# Patient Record
Sex: FEMALE | Race: WHITE | NOT HISPANIC OR LATINO | Employment: FULL TIME | URBAN - METROPOLITAN AREA
[De-identification: names, ages, dates, MRNs, and addresses within clinical notes are randomized per-mention and may not be internally consistent; named-entity substitution may affect disease eponyms.]

---

## 2017-02-13 ENCOUNTER — HOSPITAL ENCOUNTER (EMERGENCY)
Facility: HOSPITAL | Age: 47
Discharge: HOME | End: 2017-02-13
Attending: EMERGENCY MEDICINE | Admitting: EMERGENCY MEDICINE

## 2017-02-13 ENCOUNTER — APPOINTMENT (OUTPATIENT)
Dept: GENERAL RADIOLOGY | Facility: HOSPITAL | Age: 47
End: 2017-02-13

## 2017-02-13 VITALS
RESPIRATION RATE: 16 BRPM | WEIGHT: 148 LBS | DIASTOLIC BLOOD PRESSURE: 95 MMHG | SYSTOLIC BLOOD PRESSURE: 140 MMHG | TEMPERATURE: 98.5 F | HEIGHT: 63 IN | BODY MASS INDEX: 26.22 KG/M2 | OXYGEN SATURATION: 98 % | HEART RATE: 75 BPM

## 2017-02-13 DIAGNOSIS — R55 NEAR SYNCOPE: Primary | ICD-10-CM

## 2017-02-13 LAB
ALBUMIN SERPL-MCNC: 4.3 G/DL (ref 3.5–5.2)
ALBUMIN/GLOB SERPL: 1.6 G/DL
ALP SERPL-CCNC: 77 U/L (ref 40–129)
ALT SERPL W P-5'-P-CCNC: 22 U/L (ref 5–33)
ANION GAP SERPL CALCULATED.3IONS-SCNC: 12 MMOL/L
AST SERPL-CCNC: 22 U/L (ref 5–32)
B-HCG UR QL: NEGATIVE
BASOPHILS # BLD AUTO: 0.02 10*3/MM3 (ref 0–0.2)
BASOPHILS NFR BLD AUTO: 0.3 % (ref 0–2)
BILIRUB SERPL-MCNC: 0.4 MG/DL (ref 0.2–1.2)
BILIRUB UR QL STRIP: NEGATIVE
BUN BLD-MCNC: 5 MG/DL (ref 6–20)
BUN/CREAT SERPL: 7.7 (ref 7–25)
CALCIUM SPEC-SCNC: 8.6 MG/DL (ref 8.6–10.5)
CHLORIDE SERPL-SCNC: 104 MMOL/L (ref 98–107)
CLARITY UR: CLEAR
CO2 SERPL-SCNC: 23 MMOL/L (ref 22–29)
COLOR UR: YELLOW
CREAT BLD-MCNC: 0.65 MG/DL (ref 0.57–1)
DEPRECATED RDW RBC AUTO: 43.7 FL (ref 37–54)
EOSINOPHIL # BLD AUTO: 0.08 10*3/MM3 (ref 0.1–0.3)
EOSINOPHIL NFR BLD AUTO: 1.2 % (ref 0–4)
ERYTHROCYTE [DISTWIDTH] IN BLOOD BY AUTOMATED COUNT: 13.4 % (ref 11.5–14.5)
GFR SERPL CREATININE-BSD FRML MDRD: 98 ML/MIN/1.73
GLOBULIN UR ELPH-MCNC: 2.7 GM/DL
GLUCOSE BLD-MCNC: 109 MG/DL (ref 65–99)
GLUCOSE UR STRIP-MCNC: NEGATIVE MG/DL
HCT VFR BLD AUTO: 42.4 % (ref 37–47)
HGB BLD-MCNC: 13.9 G/DL (ref 12–16)
HGB UR QL STRIP.AUTO: NEGATIVE
IMM GRANULOCYTES # BLD: 0.02 10*3/MM3 (ref 0–0.03)
IMM GRANULOCYTES NFR BLD: 0.3 % (ref 0–0.5)
KETONES UR QL STRIP: NEGATIVE
LEUKOCYTE ESTERASE UR QL STRIP.AUTO: NEGATIVE
LIPASE SERPL-CCNC: 22 U/L (ref 13–60)
LYMPHOCYTES # BLD AUTO: 1.41 10*3/MM3 (ref 0.6–4.8)
LYMPHOCYTES NFR BLD AUTO: 21.9 % (ref 20–45)
MCH RBC QN AUTO: 28.8 PG (ref 27–31)
MCHC RBC AUTO-ENTMCNC: 32.8 G/DL (ref 31–37)
MCV RBC AUTO: 88 FL (ref 81–99)
MONOCYTES # BLD AUTO: 0.41 10*3/MM3 (ref 0–1)
MONOCYTES NFR BLD AUTO: 6.4 % (ref 3–8)
NEUTROPHILS # BLD AUTO: 4.51 10*3/MM3 (ref 1.5–8.3)
NEUTROPHILS NFR BLD AUTO: 69.9 % (ref 45–70)
NITRITE UR QL STRIP: NEGATIVE
NRBC BLD MANUAL-RTO: 0 /100 WBC (ref 0–0)
PH UR STRIP.AUTO: 5.5 [PH] (ref 4.5–8)
PLATELET # BLD AUTO: 318 10*3/MM3 (ref 140–500)
PMV BLD AUTO: 10 FL (ref 7.4–10.4)
POTASSIUM BLD-SCNC: 3.9 MMOL/L (ref 3.5–5.2)
PROT SERPL-MCNC: 7 G/DL (ref 6–8.5)
PROT UR QL STRIP: NEGATIVE
RBC # BLD AUTO: 4.82 10*6/MM3 (ref 4.2–5.4)
SODIUM BLD-SCNC: 139 MMOL/L (ref 136–145)
SP GR UR STRIP: <=1.005 (ref 1–1.03)
TROPONIN T SERPL-MCNC: <0.01 NG/ML (ref 0–0.03)
UROBILINOGEN UR QL STRIP: NORMAL
WBC NRBC COR # BLD: 6.45 10*3/MM3 (ref 4.8–10.8)

## 2017-02-13 PROCEDURE — 81025 URINE PREGNANCY TEST: CPT | Performed by: EMERGENCY MEDICINE

## 2017-02-13 PROCEDURE — 99284 EMERGENCY DEPT VISIT MOD MDM: CPT

## 2017-02-13 PROCEDURE — 99284 EMERGENCY DEPT VISIT MOD MDM: CPT | Performed by: EMERGENCY MEDICINE

## 2017-02-13 PROCEDURE — 84484 ASSAY OF TROPONIN QUANT: CPT | Performed by: EMERGENCY MEDICINE

## 2017-02-13 PROCEDURE — 85025 COMPLETE CBC W/AUTO DIFF WBC: CPT | Performed by: EMERGENCY MEDICINE

## 2017-02-13 PROCEDURE — 83690 ASSAY OF LIPASE: CPT | Performed by: EMERGENCY MEDICINE

## 2017-02-13 PROCEDURE — 81003 URINALYSIS AUTO W/O SCOPE: CPT | Performed by: EMERGENCY MEDICINE

## 2017-02-13 PROCEDURE — 93005 ELECTROCARDIOGRAM TRACING: CPT | Performed by: EMERGENCY MEDICINE

## 2017-02-13 PROCEDURE — 71010 HC CHEST PA OR AP: CPT

## 2017-02-13 PROCEDURE — 80053 COMPREHEN METABOLIC PANEL: CPT | Performed by: EMERGENCY MEDICINE

## 2017-02-13 PROCEDURE — 93010 ELECTROCARDIOGRAM REPORT: CPT | Performed by: INTERNAL MEDICINE

## 2017-02-13 RX ORDER — ALBUTEROL SULFATE 90 UG/1
AEROSOL, METERED RESPIRATORY (INHALATION)
COMMUNITY
Start: 2015-04-28

## 2017-02-13 RX ORDER — ALPRAZOLAM 0.5 MG/1
0.5 TABLET ORAL 3 TIMES DAILY PRN
COMMUNITY
Start: 2014-08-29 | End: 2018-08-06 | Stop reason: HOSPADM

## 2017-02-13 RX ORDER — PANTOPRAZOLE SODIUM 40 MG/1
TABLET, DELAYED RELEASE ORAL DAILY
COMMUNITY
Start: 2015-08-18 | End: 2017-06-23

## 2017-02-13 RX ORDER — RABEPRAZOLE SODIUM 20 MG/1
20 TABLET, DELAYED RELEASE ORAL DAILY
COMMUNITY
End: 2017-06-23

## 2017-02-13 NOTE — ED PROVIDER NOTES
Subjective   Patient is a 46 y.o. female presenting with syncope.   Syncope   Episode history:  Single  Most recent episode:  Today  Timing:  Constant  Progression:  Resolved  Chronicity:  New  Context comment:  At work walking felt lightheaded numbness all body then passes out no injury  Witnessed: yes    Relieved by:  Nothing  Worsened by:  Nothing  Ineffective treatments:  None tried  Associated symptoms: no chest pain, no confusion, no difficulty breathing, no fever, no focal weakness, no headaches and no shortness of breath        Review of Systems   Constitutional: Negative for fever.   Respiratory: Negative for shortness of breath.    Cardiovascular: Positive for syncope. Negative for chest pain.   Neurological: Negative for focal weakness and headaches.   Psychiatric/Behavioral: Negative for confusion.   All other systems reviewed and are negative.      Past Medical History   Diagnosis Date   • Anxiety    • GERD (gastroesophageal reflux disease)        Allergies   Allergen Reactions   • Cefprozil    • Codeine    • Sulfa Antibiotics    • Tramadol        Past Surgical History   Procedure Laterality Date   • Tubal abdominal ligation     • Hysterectomy     • Cholecystectomy     • Appendectomy     • Shoulder arthroscopy Right        No family history on file.    Social History     Social History   • Marital status:      Spouse name: N/A   • Number of children: N/A   • Years of education: N/A     Social History Main Topics   • Smoking status: Current Every Day Smoker     Packs/day: 1.00     Types: Cigarettes   • Smokeless tobacco: Not on file   • Alcohol use Yes      Comment: seldom   • Drug use: No   • Sexual activity: Defer     Other Topics Concern   • Not on file     Social History Narrative   • No narrative on file           Objective   Physical Exam   Constitutional: She is oriented to person, place, and time. She appears well-developed and well-nourished. No distress.   HENT:   Head: Normocephalic and  atraumatic.   Mouth/Throat: Oropharynx is clear and moist.   Eyes: Conjunctivae and EOM are normal. Pupils are equal, round, and reactive to light.   Neck: Normal range of motion. Neck supple.   Cardiovascular: Normal rate, regular rhythm, normal heart sounds and intact distal pulses.    Pulmonary/Chest: Effort normal and breath sounds normal.   Abdominal: Soft. Bowel sounds are normal. She exhibits no distension. There is no tenderness.   Musculoskeletal: Normal range of motion. She exhibits no edema.   Neurological: She is alert and oriented to person, place, and time. She has normal reflexes. No cranial nerve deficit.   Skin: Skin is warm. No erythema. No pallor.   Psychiatric: She has a normal mood and affect.   Nursing note and vitals reviewed.      Procedures         ED Course  ED Course                  MDM  Number of Diagnoses or Management Options  Diagnosis management comments: Reeval, appears well, vss, ok with plan to f/u pmd/cards        Final diagnoses:   None            Flo Pisano MD  02/13/17 [id number]

## 2017-02-14 ENCOUNTER — HOSPITAL ENCOUNTER (EMERGENCY)
Facility: HOSPITAL | Age: 47
End: 2017-02-14

## 2017-06-23 ENCOUNTER — OFFICE VISIT (OUTPATIENT)
Dept: OBSTETRICS AND GYNECOLOGY | Facility: CLINIC | Age: 47
End: 2017-06-23

## 2017-06-23 VITALS
DIASTOLIC BLOOD PRESSURE: 98 MMHG | HEIGHT: 63 IN | BODY MASS INDEX: 27.46 KG/M2 | SYSTOLIC BLOOD PRESSURE: 142 MMHG | WEIGHT: 155 LBS

## 2017-06-23 DIAGNOSIS — R10.32 LLQ PAIN: ICD-10-CM

## 2017-06-23 DIAGNOSIS — Z00.00 ENCOUNTER FOR ANNUAL GENERAL MEDICAL EXAMINATION WITHOUT ABNORMAL FINDINGS IN ADULT: ICD-10-CM

## 2017-06-23 DIAGNOSIS — Z13.9 SCREENING: Primary | ICD-10-CM

## 2017-06-23 DIAGNOSIS — F17.200 SMOKER: ICD-10-CM

## 2017-06-23 PROBLEM — K21.9 GERD (GASTROESOPHAGEAL REFLUX DISEASE): Status: ACTIVE | Noted: 2017-06-23

## 2017-06-23 PROBLEM — K58.9 IBS (IRRITABLE BOWEL SYNDROME): Status: ACTIVE | Noted: 2017-06-23

## 2017-06-23 PROBLEM — F41.9 ANXIETY: Status: ACTIVE | Noted: 2017-06-23

## 2017-06-23 PROBLEM — Z90.710 S/P LAPAROSCOPIC HYSTERECTOMY: Status: ACTIVE | Noted: 2017-06-23

## 2017-06-23 LAB
BILIRUB BLD-MCNC: NEGATIVE MG/DL
CLARITY, POC: CLEAR
COLOR UR: YELLOW
GLUCOSE UR STRIP-MCNC: NEGATIVE MG/DL
KETONES UR QL: NEGATIVE
LEUKOCYTE EST, POC: NEGATIVE
NITRITE UR-MCNC: NEGATIVE MG/ML
PH UR: 5 [PH] (ref 5–8)
PROT UR STRIP-MCNC: NEGATIVE MG/DL
RBC # UR STRIP: NEGATIVE /UL
SP GR UR: 1.02 (ref 1–1.03)
UROBILINOGEN UR QL: NORMAL

## 2017-06-23 PROCEDURE — 81002 URINALYSIS NONAUTO W/O SCOPE: CPT | Performed by: OBSTETRICS & GYNECOLOGY

## 2017-06-23 PROCEDURE — 99396 PREV VISIT EST AGE 40-64: CPT | Performed by: OBSTETRICS & GYNECOLOGY

## 2017-06-23 RX ORDER — LISINOPRIL 10 MG/1
10 TABLET ORAL DAILY
COMMUNITY
End: 2023-03-24

## 2017-06-23 RX ORDER — LINACLOTIDE 145 UG/1
CAPSULE, GELATIN COATED ORAL
COMMUNITY
Start: 2017-06-11 | End: 2021-11-24

## 2017-06-23 RX ORDER — DEXLANSOPRAZOLE 60 MG/1
CAPSULE, DELAYED RELEASE ORAL
COMMUNITY
Start: 2017-06-11 | End: 2021-11-24

## 2017-06-23 RX ORDER — FLUTICASONE PROPIONATE 50 MCG
2 SPRAY, SUSPENSION (ML) NASAL DAILY
COMMUNITY

## 2017-06-23 NOTE — PROGRESS NOTES
GYN Annual Exam     CC- Here for annual exam.     Jeannie Peacock is a 46 y.o. female est pt who presents for annual well woman exam. Has had TLH, no cycles. Occ has some LLQ pain.     OB History      Para Term  AB TAB SAB Ectopic Multiple Living    1 1 1       1        Obstetric Comments    1           Menarche: 11  Current contraception: status post hysterectomy  History of abnormal Pap smear: yes - s/p cryo  History of abnormal mammogram: no  Family history of uterine, colon or ovarian cancer: no  Family history of breast cancer: no  H/o STDs: none    Health Maintenance   Topic Date Due   • PNEUMOCOCCAL VACCINE (19-64 MEDIUM RISK) (1 of 1 - PPSV23) 10/20/1988   • TDAP/TD VACCINES (1 - Tdap) 10/20/1988   • PAP SMEAR  2017   • INFLUENZA VACCINE  2017       Past Medical History:   Diagnosis Date   • Abnormal Pap smear of cervix        • Anxiety    • Cervical dysplasia     s/p cryo   • GERD (gastroesophageal reflux disease)    • IBS (irritable bowel syndrome) 2017   • S/P laparoscopic hysterectomy 2017   • Smoker 2017       Past Surgical History:   Procedure Laterality Date   • APPENDECTOMY     • CHOLECYSTECTOMY     • DILATATION AND CURETTAGE     • ENDOMETRIAL ABLATION     • EXPLORATORY LAPAROTOMY     • GYNECOLOGIC CRYOSURGERY     • HYSTERECTOMY     • SHOULDER ARTHROSCOPY Right    • TUBAL ABDOMINAL LIGATION           Current Outpatient Prescriptions:   •  ALPRAZolam (XANAX) 0.5 MG tablet, Take 0.5 mg by mouth 3 (Three) Times a Day As Needed for anxiety., Disp: , Rfl:   •  DEXILANT 60 MG capsule, , Disp: , Rfl:   •  fluticasone (FLONASE) 50 MCG/ACT nasal spray, 2 sprays into each nostril Daily., Disp: , Rfl:   •  LINZESS 145 MCG capsule, , Disp: , Rfl:   •  lisinopril (PRINIVIL,ZESTRIL) 10 MG tablet, Take 10 mg by mouth Daily., Disp: , Rfl:   •  Probiotic Product (PROBIOTIC DAILY PO), Take 1 tablet by mouth Daily., Disp: , Rfl:   •  albuterol (PROAIR HFA) 108 (90 BASE)  "MCG/ACT inhaler, ProAir  (90 Base) MCG/ACT Inhalation Aerosol Solution; Patient Sig: ProAir  (90 Base) MCG/ACT Inhalation Aerosol Solution ; 0; 28-Apr-2015; Active, Disp: , Rfl:     Allergies   Allergen Reactions   • Cefprozil    • Codeine    • Sulfa Antibiotics    • Tramadol        Social History   Substance Use Topics   • Smoking status: Current Every Day Smoker     Packs/day: 1.00     Types: Cigarettes   • Smokeless tobacco: None   • Alcohol use Yes      Comment: seldom       Family History   Problem Relation Age of Onset   • Breast cancer Neg Hx    • Ovarian cancer Neg Hx    • Colon cancer Neg Hx        Review of Systems   Constitutional: Negative for appetite change, fatigue, fever and unexpected weight change.   Respiratory: Negative for cough and shortness of breath.    Cardiovascular: Negative for chest pain and palpitations.   Gastrointestinal: Positive for abdominal pain (LLQ pain). Negative for abdominal distention, constipation, diarrhea and nausea.   Genitourinary: Negative for dyspareunia, dysuria, menstrual problem, pelvic pain and vaginal discharge.   Skin: Negative for color change and rash.   Neurological: Negative for headaches.   Psychiatric/Behavioral: Negative for dysphoric mood. The patient is not nervous/anxious.        /98  Ht 63\" (160 cm)  Wt 155 lb (70.3 kg)  BMI 27.46 kg/m2    Physical Exam   Constitutional: She is oriented to person, place, and time. She appears well-developed and well-nourished.   HENT:   Head: Normocephalic and atraumatic.   Neck: No thyromegaly present.   Cardiovascular: Normal rate and regular rhythm.    Pulmonary/Chest: Effort normal and breath sounds normal. Right breast exhibits no inverted nipple, no mass, no nipple discharge, no skin change and no tenderness. Left breast exhibits no inverted nipple, no mass, no nipple discharge, no skin change and no tenderness.   Abdominal: Soft. Bowel sounds are normal. She exhibits no distension and no " mass. There is no tenderness. No hernia.   Genitourinary: Pelvic exam was performed with patient supine. There is no rash, tenderness or lesion on the right labia. There is no rash, tenderness or lesion on the left labia. Right adnexum displays no mass, no tenderness and no fullness. Left adnexum displays tenderness. Left adnexum displays no mass and no fullness. No erythema, tenderness or bleeding in the vagina. No signs of injury around the vagina. No vaginal discharge found.   Genitourinary Comments: TTP LLQ  No rebound nor guarding  Normal cuff   Neurological: She is oriented to person, place, and time.   Skin: Skin is warm and dry.   Psychiatric: She has a normal mood and affect. Her behavior is normal. Judgment and thought content normal.   Vitals reviewed.         Assessment/Plan    1) GYN HM: check pap/HPV   SBE demonstrated and encouraged.  2) STD screening: declines Condoms encouraged.  3) Contraception: s/p TLH  4) Family Planning: no plans  5) Diet and Exercise discussed  6) Smoking Status: counseled 3-10 min re: cessation  7) Social: , works at [employer]  8) MMG- scheduled for end of month  9)Follow up prn or 1 year  10) LLQ pain exam- check TVUS     Jeannie was seen today for gynecologic exam.    Diagnoses and all orders for this visit:    Screening  -     POC Urinalysis Dipstick    Encounter for annual general medical examination without abnormal findings in adult  -     Pap IG, HPV-hr    Smoker    LLQ pain          Soraya Mccabe MD  6/23/2017  10:02 PM

## 2017-06-27 LAB
CYTOLOGIST CVX/VAG CYTO: NORMAL
CYTOLOGY CVX/VAG DOC THIN PREP: NORMAL
DX ICD CODE: NORMAL
DX ICD CODE: NORMAL
HIV 1 & 2 AB SER-IMP: NORMAL
HPV I/H RISK 1 DNA CVX QL PROBE+SIG AMP: NEGATIVE
OTHER STN SPEC: NORMAL
PATH REPORT.FINAL DX SPEC: NORMAL
STAT OF ADQ CVX/VAG CYTO-IMP: NORMAL

## 2017-06-27 RX ORDER — METRONIDAZOLE 500 MG/1
500 TABLET ORAL 2 TIMES DAILY
Qty: 14 TABLET | Refills: 0 | Status: SHIPPED | OUTPATIENT
Start: 2017-06-27 | End: 2017-07-04

## 2017-06-30 ENCOUNTER — PROCEDURE VISIT (OUTPATIENT)
Dept: OBSTETRICS AND GYNECOLOGY | Facility: CLINIC | Age: 47
End: 2017-06-30

## 2017-06-30 DIAGNOSIS — R10.2 PELVIC PAIN: Primary | ICD-10-CM

## 2017-09-18 ENCOUNTER — PROCEDURE VISIT (OUTPATIENT)
Dept: OBSTETRICS AND GYNECOLOGY | Facility: CLINIC | Age: 47
End: 2017-09-18

## 2017-09-18 ENCOUNTER — OFFICE VISIT (OUTPATIENT)
Dept: OBSTETRICS AND GYNECOLOGY | Facility: CLINIC | Age: 47
End: 2017-09-18

## 2017-09-18 VITALS
BODY MASS INDEX: 27.52 KG/M2 | HEIGHT: 63 IN | SYSTOLIC BLOOD PRESSURE: 142 MMHG | WEIGHT: 155.3 LBS | DIASTOLIC BLOOD PRESSURE: 80 MMHG

## 2017-09-18 DIAGNOSIS — N83.201 CYSTS OF BOTH OVARIES: Primary | ICD-10-CM

## 2017-09-18 DIAGNOSIS — N83.202 CYSTS OF BOTH OVARIES: Primary | ICD-10-CM

## 2017-09-18 DIAGNOSIS — R30.0 DYSURIA: ICD-10-CM

## 2017-09-18 DIAGNOSIS — R10.2 PELVIC PAIN: Primary | ICD-10-CM

## 2017-09-18 PROCEDURE — 99213 OFFICE O/P EST LOW 20 MIN: CPT | Performed by: OBSTETRICS & GYNECOLOGY

## 2017-09-18 PROCEDURE — 76830 TRANSVAGINAL US NON-OB: CPT | Performed by: OBSTETRICS & GYNECOLOGY

## 2017-09-18 NOTE — PROGRESS NOTES
"Jeannie Peacock is a 46 y.o. patient who presents for follow up of   Chief Complaint   Patient presents with   • Follow-up     ovarian cyst, pain and pressure and leg pain on that side        45 yo est pt here for a several month h/o pelvic pressure in the midline. She is having urinary frequency and pressure and feels like her bladder is not emptying well. She had a C scope in April of 2017 and it was normal. She has a h/o IBS but this pain feels lower down in her pelvis. She does not have any pain or bleeding, no dysuria. No fevers, no nausea or vomiting. She had an US in June of this year that showed a small R ovarian cyst that was 2.3 x 2.4 cms. She has been taking Linzess for constipation but stopped it last week after an episode of fecal incontinence.       The following portions of the patient's history were reviewed and updated as appropriate: allergies, current medications and problem list.    Review of Systems   Gastrointestinal: Negative for nausea and vomiting.   Genitourinary: Positive for frequency and pelvic pain.   Musculoskeletal: Positive for back pain.   All other systems reviewed and are negative.      /80  Ht 63\" (160 cm)  Wt 155 lb 4.8 oz (70.4 kg)  BMI 27.51 kg/m2    Physical Exam   Constitutional: She is oriented to person, place, and time. She appears well-developed and well-nourished.   HENT:   Head: Normocephalic and atraumatic.   Abdominal: Soft. Bowel sounds are normal. She exhibits no distension and no mass. There is no tenderness. There is no rebound and no guarding. No hernia.   Genitourinary: There is no rash, tenderness, lesion or injury on the right labia. There is no rash, tenderness, lesion or injury on the left labia. Right adnexum displays no mass, no tenderness and no fullness. Left adnexum displays tenderness. Left adnexum displays no mass and no fullness. No erythema, tenderness or bleeding in the vagina. No foreign body in the vagina. No signs of injury " around the vagina. No vaginal discharge found.   Genitourinary Comments: Normal cuff  TTP LLQ   Neurological: She is alert and oriented to person, place, and time.   Skin: Skin is warm and dry.   Psychiatric: She has a normal mood and affect. Her behavior is normal. Judgment and thought content normal.   Nursing note and vitals reviewed.    A/P:  1. LLQ pain- check TVUS. If US remains normal, consider CT scan.Pt had normal C scope in 6/2017.  2. Frequency/dysuria- check urine culture. Enc pt to decrease bladder irritants  3. RHM- pap UTD 6/2017    Assessment/Plan   Jeannie was seen today for follow-up.    Diagnoses and all orders for this visit:    Pelvic pain  -     Urine Culture    Dysuria                   No Follow-up on file.      Soraya Mccabe MD    9/24/2017  5:03 PM

## 2017-09-20 LAB
BACTERIA UR CULT: NO GROWTH
BACTERIA UR CULT: NORMAL

## 2017-09-20 NOTE — PROGRESS NOTES
PIP= US shows very small cysts on both ovaries, less than 2 cms each. This was compared to her US on 6/30/17. I do not think that these are the cause of her discomfort. Is she interested in getting a CT scan to evaluate her pain?

## 2017-09-24 PROBLEM — R30.0 DYSURIA: Status: ACTIVE | Noted: 2017-09-24

## 2017-09-24 PROBLEM — R10.2 PELVIC PAIN: Status: ACTIVE | Noted: 2017-09-24

## 2017-09-26 ENCOUNTER — TELEPHONE (OUTPATIENT)
Dept: OBSTETRICS AND GYNECOLOGY | Facility: CLINIC | Age: 47
End: 2017-09-26

## 2017-09-26 DIAGNOSIS — R10.30 LOWER ABDOMINAL PAIN: Primary | ICD-10-CM

## 2017-09-26 NOTE — TELEPHONE ENCOUNTER
PT CALLED REQ TO HAVE CT OF ABDOMEN DONE WITH AND W/O CONTRAST.   PLS PUT IN ORDER SO WE CAN GET HER SCHEDULED.     THANK YOU :)

## 2017-10-03 ENCOUNTER — HOSPITAL ENCOUNTER (OUTPATIENT)
Dept: CT IMAGING | Facility: HOSPITAL | Age: 47
Discharge: HOME | End: 2017-10-03
Attending: OBSTETRICS & GYNECOLOGY | Admitting: OBSTETRICS & GYNECOLOGY

## 2017-10-03 DIAGNOSIS — R10.30 LOWER ABDOMINAL PAIN: ICD-10-CM

## 2017-10-03 PROCEDURE — 0 DIATRIZOATE MEGLUMINE & SODIUM PER 1 ML: Performed by: OBSTETRICS & GYNECOLOGY

## 2017-10-03 PROCEDURE — 74177 CT ABD & PELVIS W/CONTRAST: CPT

## 2017-10-03 PROCEDURE — 0 IOPAMIDOL PER 1 ML: Performed by: OBSTETRICS & GYNECOLOGY

## 2017-10-03 RX ADMIN — DIATRIZOATE MEGLUMINE AND DIATRIZOATE SODIUM 30 ML: 600; 100 SOLUTION ORAL; RECTAL at 07:45

## 2017-10-03 RX ADMIN — IOPAMIDOL 100 ML: 755 INJECTION, SOLUTION INTRAVENOUS at 09:00

## 2018-01-29 ENCOUNTER — TELEPHONE (OUTPATIENT)
Dept: NEUROSURGERY | Facility: CLINIC | Age: 48
End: 2018-01-29

## 2018-01-29 NOTE — TELEPHONE ENCOUNTER
Left message reminding patient that we need her new patient packet by Thursday Feb 1, 2018 for her new patient appointment. Pt was advised that she will need to bring the packet in to the office or fax it because if she mails it at this point we may not receive it in time to avoid her new patient appointment being cancelled. Left the office contact information including direct fax number.

## 2018-02-02 ENCOUNTER — TELEPHONE (OUTPATIENT)
Dept: NEUROSURGERY | Facility: CLINIC | Age: 48
End: 2018-02-02

## 2018-08-06 ENCOUNTER — OFFICE VISIT (OUTPATIENT)
Dept: NEUROSURGERY | Facility: CLINIC | Age: 48
End: 2018-08-06

## 2018-08-06 VITALS
HEIGHT: 63 IN | WEIGHT: 159 LBS | DIASTOLIC BLOOD PRESSURE: 63 MMHG | BODY MASS INDEX: 28.17 KG/M2 | SYSTOLIC BLOOD PRESSURE: 114 MMHG | HEART RATE: 68 BPM

## 2018-08-06 DIAGNOSIS — M50.30 DEGENERATIVE DISC DISEASE, CERVICAL: ICD-10-CM

## 2018-08-06 DIAGNOSIS — M43.22 CERVICAL SPINE ANKYLOSIS: ICD-10-CM

## 2018-08-06 DIAGNOSIS — M51.36 DEGENERATIVE DISC DISEASE, LUMBAR: Primary | ICD-10-CM

## 2018-08-06 PROCEDURE — 99243 OFF/OP CNSLTJ NEW/EST LOW 30: CPT | Performed by: NURSE PRACTITIONER

## 2018-08-06 RX ORDER — METHYLPREDNISOLONE 4 MG/1
TABLET ORAL
Qty: 1 EACH | Refills: 0 | Status: SHIPPED | OUTPATIENT
Start: 2018-08-06 | End: 2021-11-24

## 2018-08-06 NOTE — PROGRESS NOTES
Subjective   Patient ID: Jeannie Peacock is a 47 y.o. female is being seen for consultation today at the request of BERYL Suazo for neck pain and back pain that radiates into bilateral extremities. Patient has been to PT for treatment and says it did help. Patient has a history of cervical TABITHA's as well.    This is a 47-year-old female with a history of prolonged neck and low back pain.  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  She is recently been transferred into a less physical job but is still required to do some lifting on occasion.  She saw a another neurosurgeon approximate 2 years ago.  No surgery was recommended and she was referred for physical therapy and injection therapies.  The patient denies any radiating arm pain or weakness.  She does have some occasional radiating pain in the buttock and posterior thigh as well as episodes of charley horses in both legs.  She denies any falls or trauma.  She denies any bowel or bladder incontinence. Ms. Peacock was referred to our office for a neurosurgical opinion by Arlene Alexander PA-C.  Notes from today's visit will be forwarded upon completion.      Neck Pain    This is a chronic problem. The current episode started more than 1 year ago (10-15 years). The problem occurs intermittently. The problem has been gradually worsening. The pain is associated with nothing. The pain is present in the anterior neck. The quality of the pain is described as burning, aching, shooting and stabbing (burning in bilateral feet). The pain is at a severity of 5/10. The symptoms are aggravated by position. Worse during: worse at night. Associated symptoms include headaches, leg pain, numbness, tingling and weakness. Pertinent negatives include no pain with swallowing or trouble swallowing. Treatments tried: Physical therapy, epidural injections. The treatment provided mild relief.   Back Pain   This is a chronic problem. Episode onset: 10-15  years. The problem occurs constantly. The problem has been gradually worsening since onset. The pain is present in the lumbar spine (bilateral buttock). The quality of the pain is described as aching. The pain radiates to the right knee, left knee, left thigh, right thigh, right foot and left foot. The pain is at a severity of 9/10. The pain is severe. The symptoms are aggravated by position. Associated symptoms include abdominal pain, headaches, leg pain, numbness, tingling and weakness. Pertinent negatives include no bladder incontinence or bowel incontinence. (No episodes of bowel or bladder incontinence.  Has some occasional episodes of urgency with bowel movements but no loss of bladder or bowel control.) She has tried muscle relaxant and walking (Physical therapy; epidural injections) for the symptoms.       The following portions of the patient's history were reviewed and updated as appropriate: allergies, current medications, past family history, past medical history, past social history, past surgical history and problem list.    Review of Systems   Constitutional: Positive for activity change, appetite change and fatigue.   HENT: Positive for sneezing and tinnitus. Negative for trouble swallowing.    Respiratory: Positive for shortness of breath.    Cardiovascular: Positive for palpitations and leg swelling.   Gastrointestinal: Positive for abdominal pain, constipation and diarrhea. Negative for bowel incontinence.   Genitourinary: Positive for urgency. Negative for bladder incontinence.   Musculoskeletal: Positive for arthralgias, back pain, joint swelling, neck pain and neck stiffness.   Allergic/Immunologic: Positive for environmental allergies.   Neurological: Positive for tingling, syncope, weakness, light-headedness, numbness and headaches.   Psychiatric/Behavioral: Positive for decreased concentration and sleep disturbance. The patient is nervous/anxious.        Objective   Physical Exam    Constitutional: She is oriented to person, place, and time. She appears well-developed and well-nourished. She is cooperative. No distress.   HENT:   Head: Normocephalic and atraumatic.   Eyes: Pupils are equal, round, and reactive to light. Conjunctivae and EOM are normal.   Neck: Normal range of motion. Neck supple.   Cardiovascular: Normal rate.    Pulmonary/Chest: Effort normal. No respiratory distress.   Abdominal: Soft. She exhibits no distension. There is no tenderness.   Musculoskeletal: Normal range of motion. She exhibits tenderness (tender to palpation in the posterior cervical spine particularly the left occipital cervical region.). She exhibits no edema.   Neurological: She is alert and oriented to person, place, and time. She has normal strength. She displays normal reflexes. No sensory deficit. She exhibits normal muscle tone. Coordination normal. GCS eye subscore is 4. GCS verbal subscore is 5. GCS motor subscore is 6.   No motor or sensory deficits noted on today's exam.  DTRs were normal throughout.  Negative Butts's; negative clonus.  Straight leg raise negative bilaterally.  Spurling's negative bilaterally.   Skin: Skin is warm and dry. No rash noted. She is not diaphoretic.   Psychiatric: She has a normal mood and affect. Thought content normal.   Vitals reviewed.    Neurologic Exam     Mental Status   Oriented to person, place, and time.     Cranial Nerves     CN III, IV, VI   Pupils are equal, round, and reactive to light.  Extraocular motions are normal.     Motor Exam     Strength   Strength 5/5 throughout.       Assessment/Plan   Independent Review of Radiographic Studies:      I independently reviewed MRI images of both the cervical and lumbar spine dated February 29, 2016 today in the office.  The cervical MRI showed degenerative changes at many levels particularly C5-6 and C6-7.  A concentric osteophyte noted with some evidence of extrusion at C5-C6.  Overall there is mild canal  stenosis/cord compression.  A broad-based disc protrusion also noted at C6-7 particularly on the right.    The lumbar MRI revealed multilevel degenerative changes and facet degenerative changes particularly at L4-5 where there is a mild degree of central canal stenosis and lateral recess narrowing bilaterally.  There is also right greater than left foraminal narrowing at this level.  Degenerative changes also noted at L5-S1 particularly on the left side and some possible foraminal narrowing on the left.     Medical Decision Making:      Ms. Peacock was seen today for the above complaints.  Given that there is no weakness on exam, I do not think that repeat imaging is necessary at this point.  Since she has gotten better with conservative measures in the past, I recommended repeat physical therapy for cervical and lumbar strengthening.  She will also try a Medrol Dosepak to help with inflammation.  If her symptoms fail to improve or if she worsens, Ms. Uribe will call the office otherwise I will see her back in 2 months.        Jeannie was seen today for neck pain and back pain.    Diagnoses and all orders for this visit:    Degenerative disc disease, lumbar  -     Cancel: Ambulatory Referral to Physical Therapy  -     Ambulatory Referral to Physical Therapy Evaluate and treat; Stretching (for neck and back strengthening with modalities including dry needling mild cervical traction and core strengtheing. ), ROM, Strengthening    Degenerative disc disease, cervical  -     Cancel: Ambulatory Referral to Physical Therapy  -     Ambulatory Referral to Physical Therapy Evaluate and treat; Stretching (for neck and back strengthening with modalities including dry needling mild cervical traction and core strengtheing. ), ROM, Strengthening    Cervical spine ankylosis (CMS/HCC)  -     Cancel: Ambulatory Referral to Physical Therapy  -     Ambulatory Referral to Physical Therapy Evaluate and treat; Stretching (for neck and  back strengthening with modalities including dry needling mild cervical traction and core strengtheing. ), ROM, Strengthening    Other orders  -     MethylPREDNISolone (MEDROL, FLAQUITO,) 4 MG tablet; Take as directed on package instructions.      Return in about 2 months (around 10/6/2018).

## 2018-11-26 ENCOUNTER — PROCEDURE VISIT (OUTPATIENT)
Dept: OBSTETRICS AND GYNECOLOGY | Facility: CLINIC | Age: 48
End: 2018-11-26

## 2018-11-26 ENCOUNTER — OFFICE VISIT (OUTPATIENT)
Dept: OBSTETRICS AND GYNECOLOGY | Facility: CLINIC | Age: 48
End: 2018-11-26

## 2018-11-26 VITALS
DIASTOLIC BLOOD PRESSURE: 86 MMHG | HEIGHT: 63 IN | WEIGHT: 162.1 LBS | BODY MASS INDEX: 28.72 KG/M2 | SYSTOLIC BLOOD PRESSURE: 152 MMHG

## 2018-11-26 DIAGNOSIS — Z01.419 WELL WOMAN EXAM WITH ROUTINE GYNECOLOGICAL EXAM: Primary | ICD-10-CM

## 2018-11-26 DIAGNOSIS — F17.200 SMOKER: ICD-10-CM

## 2018-11-26 DIAGNOSIS — R14.0 ABDOMINAL BLOATING: Primary | ICD-10-CM

## 2018-11-26 DIAGNOSIS — R10.2 PELVIC PAIN: ICD-10-CM

## 2018-11-26 LAB
BILIRUB BLD-MCNC: NEGATIVE MG/DL
CLARITY, POC: CLEAR
COLOR UR: YELLOW
GLUCOSE UR STRIP-MCNC: NEGATIVE MG/DL
KETONES UR QL: NEGATIVE
LEUKOCYTE EST, POC: NEGATIVE
NITRITE UR-MCNC: NEGATIVE MG/ML
PH UR: 5 [PH] (ref 5–8)
PROT UR STRIP-MCNC: NEGATIVE MG/DL
RBC # UR STRIP: NEGATIVE /UL
SP GR UR: 1 (ref 1–1.03)
UROBILINOGEN UR QL: NORMAL

## 2018-11-26 PROCEDURE — 99213 OFFICE O/P EST LOW 20 MIN: CPT | Performed by: OBSTETRICS & GYNECOLOGY

## 2018-11-26 PROCEDURE — 99406 BEHAV CHNG SMOKING 3-10 MIN: CPT | Performed by: OBSTETRICS & GYNECOLOGY

## 2018-11-26 PROCEDURE — 99396 PREV VISIT EST AGE 40-64: CPT | Performed by: OBSTETRICS & GYNECOLOGY

## 2018-11-26 PROCEDURE — 81002 URINALYSIS NONAUTO W/O SCOPE: CPT | Performed by: OBSTETRICS & GYNECOLOGY

## 2018-11-26 PROCEDURE — 76830 TRANSVAGINAL US NON-OB: CPT | Performed by: OBSTETRICS & GYNECOLOGY

## 2018-11-26 NOTE — PROGRESS NOTES
GYN Annual Exam     CC- Here for annual exam.     Jeannie Peacock is a 48 y.o. female established patient who presents for annual well woman exam. She has had a TLH with OC. She has upper abdominal pain and sees GI tomorrow. She is also has low back pain that radiates down her legs. She is having a sleep study soon. She has stopped Xanax and is on Celexa and feels it is working well. She still has intermittent LLQ pain and bloating.     OB History      Para Term  AB Living    1 1 1     1    SAB TAB Ectopic Molar Multiple Live Births                       Obstetric Comments    1           Menarche: 11  Current contraception: status post hysterectomy  History of abnormal Pap smear: yes - s/p cryo with nl f/u  History of abnormal mammogram: no  Family history of uterine, colon or ovarian cancer: no  Family history of breast cancer: no  H/o STDs: none  Gardasil: none  S/P ablation, TLH with OC pelvic pain    Health Maintenance   Topic Date Due   • PNEUMOCOCCAL VACCINE (19-64 MEDIUM RISK) (1 of 1 - PPSV23) 10/20/1988   • TDAP/TD VACCINES (1 - Tdap) 10/20/1988   • ANNUAL PHYSICAL  2018   • INFLUENZA VACCINE  2018   • PAP SMEAR  2020       Past Medical History:   Diagnosis Date   • Abnormal Pap smear of cervix        • Anxiety    • Cervical dysplasia     s/p cryo   • GERD (gastroesophageal reflux disease)    • Hypertension    • IBS (irritable bowel syndrome) 2017   • S/P laparoscopic hysterectomy 2017   • Smoker 2017       Past Surgical History:   Procedure Laterality Date   • APPENDECTOMY     • CHOLECYSTECTOMY     • DILATATION AND CURETTAGE     • ENDOMETRIAL ABLATION     • EXPLORATORY LAPAROTOMY     • GYNECOLOGIC CRYOSURGERY     • HYSTERECTOMY      TLH with OC   • SHOULDER ARTHROSCOPY Right    • TUBAL ABDOMINAL LIGATION           Current Outpatient Medications:   •  albuterol (PROAIR HFA) 108 (90 BASE) MCG/ACT inhaler, ProAir  (90 Base) MCG/ACT Inhalation  Aerosol Solution; Patient Sig: ProAir  (90 Base) MCG/ACT Inhalation Aerosol Solution ; 0; 28-Apr-2015; Active, Disp: , Rfl:   •  citalopram (CeleXA) 10 MG tablet, Take 10 mg by mouth Daily., Disp: , Rfl:   •  DEXILANT 60 MG capsule, , Disp: , Rfl:   •  fluticasone (FLONASE) 50 MCG/ACT nasal spray, 2 sprays into each nostril Daily., Disp: , Rfl:   •  LINZESS 145 MCG capsule, , Disp: , Rfl:   •  lisinopril (PRINIVIL,ZESTRIL) 10 MG tablet, Take 10 mg by mouth Daily., Disp: , Rfl:   •  metaxalone (SKELAXIN) 800 MG tablet, Take 800 mg by mouth 2 (Two) Times a Day., Disp: , Rfl:   •  MethylPREDNISolone (MEDROL, FLAQUITO,) 4 MG tablet, Take as directed on package instructions., Disp: 1 each, Rfl: 0  •  Probiotic Product (PROBIOTIC DAILY PO), Take 1 tablet by mouth Daily., Disp: , Rfl:     Allergies   Allergen Reactions   • Cefprozil    • Codeine    • Sulfa Antibiotics    • Tramadol        Social History     Tobacco Use   • Smoking status: Current Every Day Smoker     Packs/day: 1.00     Types: Cigarettes   • Smokeless tobacco: Never Used   Substance Use Topics   • Alcohol use: Yes     Comment: seldom   • Drug use: No       Family History   Problem Relation Age of Onset   • Heart attack Father    • Breast cancer Neg Hx    • Ovarian cancer Neg Hx    • Colon cancer Neg Hx        Review of Systems   Constitutional: Negative for appetite change, fatigue, fever and unexpected weight change.   Respiratory: Negative for cough and shortness of breath.    Cardiovascular: Negative for chest pain and palpitations.   Gastrointestinal: Positive for abdominal distention, abdominal pain and constipation. Negative for diarrhea and nausea.   Endocrine: Positive for heat intolerance.   Genitourinary: Negative for dyspareunia, dysuria, menstrual problem, pelvic pain and vaginal discharge.   Musculoskeletal: Positive for arthralgias, back pain, joint swelling and myalgias.   Skin: Negative for color change and rash.   Neurological: Negative  "for headaches.   Psychiatric/Behavioral: Positive for sleep disturbance. Negative for dysphoric mood. The patient is nervous/anxious.        /86   Ht 160 cm (62.99\")   Wt 73.5 kg (162 lb 1.6 oz)   Breastfeeding? No   BMI 28.72 kg/m²     Physical Exam   Constitutional: She is oriented to person, place, and time. She appears well-developed and well-nourished.   HENT:   Head: Normocephalic and atraumatic.   Eyes: Conjunctivae are normal. No scleral icterus.   Neck: Neck supple. No thyromegaly present.   Cardiovascular: Normal rate and regular rhythm.   Pulmonary/Chest: Effort normal and breath sounds normal. Right breast exhibits no inverted nipple, no mass, no nipple discharge, no skin change and no tenderness. Left breast exhibits no inverted nipple, no mass, no nipple discharge, no skin change and no tenderness.   Abdominal: Soft. Bowel sounds are normal. She exhibits no distension and no mass. There is no tenderness. There is no rebound and no guarding. No hernia.   Genitourinary: Pelvic exam was performed with patient supine. There is no rash, tenderness or lesion on the right labia. There is no rash, tenderness or lesion on the left labia. Right adnexum displays no mass, no tenderness and no fullness. Left adnexum displays tenderness. Left adnexum displays no mass and no fullness. No erythema, tenderness or bleeding in the vagina. No foreign body in the vagina. No signs of injury around the vagina. No vaginal discharge found.   Genitourinary Comments: Normal cuff  TTP LLQ   Neurological: She is alert and oriented to person, place, and time.   Skin: Skin is warm and dry.   Psychiatric: She has a normal mood and affect. Her behavior is normal. Judgment and thought content normal.   Nursing note and vitals reviewed.         Assessment/Plan    1) GYN HM: normal pap/HPV 6/2017    SBE demonstrated and encouraged.  2) STD screening: declines Condoms encouraged.  3) Contraception: s/p TLH  4) SMOKER-  I advised " Jeannie of the risks of continuing to use tobacco, and I provided her with tobacco cessation educational materials . During this visit, I spent 4 minutes counseling the patient regarding tobacco cessation., encourage folic acid daily  5) Diet and Exercise discussed  6) LLQ pain - check TVUS.   7) C scope UTD 10/2014, repeat 10/2019   8) MMG-  UTD 7/2018   9)Follow up prn or 1 year       Jeannie was seen today for gynecologic exam.    Diagnoses and all orders for this visit:    Well woman exam with routine gynecological exam  -     POC Urinalysis Dipstick    Smoker    Pelvic pain          Soraya Mccabe MD  11/26/18  10:10 PM

## 2019-12-09 ENCOUNTER — OFFICE VISIT (OUTPATIENT)
Dept: OBSTETRICS AND GYNECOLOGY | Facility: CLINIC | Age: 49
End: 2019-12-09

## 2019-12-09 VITALS
BODY MASS INDEX: 30.55 KG/M2 | HEIGHT: 62 IN | SYSTOLIC BLOOD PRESSURE: 122 MMHG | WEIGHT: 166 LBS | DIASTOLIC BLOOD PRESSURE: 80 MMHG

## 2019-12-09 DIAGNOSIS — Z01.419 PAP SMEAR, LOW-RISK: Primary | ICD-10-CM

## 2019-12-09 DIAGNOSIS — Z01.411 ENCOUNTER FOR GYNECOLOGICAL EXAMINATION WITH ABNORMAL FINDING: ICD-10-CM

## 2019-12-09 DIAGNOSIS — Z11.51 SPECIAL SCREENING EXAMINATION FOR HUMAN PAPILLOMAVIRUS (HPV): ICD-10-CM

## 2019-12-09 DIAGNOSIS — D22.9 ATYPICAL MOLE: ICD-10-CM

## 2019-12-09 DIAGNOSIS — Z13.9 SCREENING FOR CONDITION: ICD-10-CM

## 2019-12-09 DIAGNOSIS — F17.200 SMOKER: ICD-10-CM

## 2019-12-09 LAB
BILIRUB BLD-MCNC: NEGATIVE MG/DL
CLARITY, POC: CLEAR
COLOR UR: YELLOW
GLUCOSE UR STRIP-MCNC: NEGATIVE MG/DL
KETONES UR QL: NEGATIVE
LEUKOCYTE EST, POC: NEGATIVE
NITRITE UR-MCNC: NEGATIVE MG/ML
PH UR: 6 [PH] (ref 5–8)
PROT UR STRIP-MCNC: NEGATIVE MG/DL
RBC # UR STRIP: NEGATIVE /UL
SP GR UR: 1.02 (ref 1–1.03)
UROBILINOGEN UR QL: NORMAL

## 2019-12-09 PROCEDURE — 99213 OFFICE O/P EST LOW 20 MIN: CPT | Performed by: OBSTETRICS & GYNECOLOGY

## 2019-12-09 PROCEDURE — 81002 URINALYSIS NONAUTO W/O SCOPE: CPT | Performed by: OBSTETRICS & GYNECOLOGY

## 2019-12-09 PROCEDURE — 99396 PREV VISIT EST AGE 40-64: CPT | Performed by: OBSTETRICS & GYNECOLOGY

## 2019-12-09 RX ORDER — ALPRAZOLAM 1 MG/1
TABLET ORAL
Refills: 0 | COMMUNITY
Start: 2019-09-16 | End: 2020-12-10

## 2019-12-09 RX ORDER — DOXYCYCLINE HYCLATE 100 MG/1
CAPSULE ORAL
Refills: 0 | COMMUNITY
Start: 2019-11-25 | End: 2021-11-24

## 2019-12-09 RX ORDER — RIFAXIMIN 550 MG/1
550 TABLET ORAL 3 TIMES DAILY
Refills: 1 | COMMUNITY
Start: 2019-09-06 | End: 2021-11-24

## 2019-12-09 RX ORDER — CITALOPRAM 20 MG/1
20 TABLET ORAL DAILY
Refills: 0 | COMMUNITY
Start: 2019-11-15 | End: 2020-12-10

## 2019-12-09 NOTE — PROGRESS NOTES
GYN Annual Exam     CC- Here for annual exam.     Jeannie Peacock is a 49 y.o. female established patient who presents for annual well woman exam. She has had a TLH with OC. Had a Cscope and it was normal. Still with some upper GI bloating but no changes.  Still smoking heavily. She has Chantix at home but has not started it yet. Still with back pain and is seeing a rheumatologist soon.  Has an itchy spot on her back. She also has some pain with sex and some insensible urine loss.  We discussed the pelvic floor physical therapy can help both of these problems but she declines treatment for now.  We discussed decreasing bladder irritants and doing Keagle's to help her pelvic floor.  OB History        1    Para   1    Term   1            AB        Living   1       SAB        TAB        Ectopic        Molar        Multiple        Live Births              Obstetric Comments   1              Menarche: 11  Current contraception: status post hysterectomy   HRT: none  History of abnormal Pap smear: yes - s/p cryo with nl f/u  History of abnormal mammogram: no  Family history of uterine, colon or ovarian cancer: no  Family history of breast cancer: no  H/o STDs: none  Gardasil: none  S/P ablation, TLH with OC pelvic pain  Last pap: 2017- normal pap/HPV  DOREEN: none    Health Maintenance   Topic Date Due   • TDAP/TD VACCINES (1 - Tdap) 10/20/1980   • PNEUMOCOCCAL VACCINE (19-64 MEDIUM RISK) (1 of 1 - PPSV23) 10/20/1988   • COLONOSCOPY  2017   • ANNUAL PHYSICAL  2018   • INFLUENZA VACCINE  2019   • ZOSTER VACCINE (1 of 2) 10/20/2019   • Annual Gynecologic Pelvic and Breast Exam  2019   • PAP SMEAR  2020   • MAMMOGRAM  2021       Past Medical History:   Diagnosis Date   • Abnormal Pap smear of cervix        • Anxiety    • Cervical dysplasia     s/p cryo   • GERD (gastroesophageal reflux disease)    • Hypertension    • IBS (irritable bowel syndrome) 2017   • S/P  laparoscopic hysterectomy 6/23/2017   • Smoker 6/23/2017       Past Surgical History:   Procedure Laterality Date   • APPENDECTOMY     • CHOLECYSTECTOMY     • DILATATION AND CURETTAGE     • ENDOMETRIAL ABLATION     • EXPLORATORY LAPAROTOMY     • GYNECOLOGIC CRYOSURGERY     • HYSTERECTOMY      TLH with OC   • SHOULDER ARTHROSCOPY Right    • TUBAL ABDOMINAL LIGATION           Current Outpatient Medications:   •  albuterol (PROAIR HFA) 108 (90 BASE) MCG/ACT inhaler, ProAir  (90 Base) MCG/ACT Inhalation Aerosol Solution; Patient Sig: ProAir  (90 Base) MCG/ACT Inhalation Aerosol Solution ; 0; 28-Apr-2015; Active, Disp: , Rfl:   •  ALPRAZolam (XANAX) 1 MG tablet, TAKE 1 TABLET 30 MINUTES PRIOR TO MRI, Disp: , Rfl: 0  •  CHANTIX STARTING MONTH FLAQUITO 0.5 MG X 11 & 1 MG X 42 tablet, See Admin Instructions. see package, Disp: , Rfl: 2  •  citalopram (CeleXA) 20 MG tablet, Take 20 mg by mouth Daily., Disp: , Rfl: 0  •  DEXILANT 60 MG capsule, , Disp: , Rfl:   •  doxycycline (VIBRAMYCIN) 100 MG capsule, TAKE 1 CAPSULE BY MOUTH TWICE A DAY UNTIL FINISHED, Disp: , Rfl: 0  •  fluticasone (FLONASE) 50 MCG/ACT nasal spray, 2 sprays into each nostril Daily., Disp: , Rfl:   •  LINZESS 145 MCG capsule, , Disp: , Rfl:   •  lisinopril (PRINIVIL,ZESTRIL) 10 MG tablet, Take 10 mg by mouth Daily., Disp: , Rfl:   •  metaxalone (SKELAXIN) 800 MG tablet, Take 800 mg by mouth 2 (Two) Times a Day., Disp: , Rfl:   •  MethylPREDNISolone (MEDROL, FLAQUITO,) 4 MG tablet, Take as directed on package instructions., Disp: 1 each, Rfl: 0  •  Probiotic Product (PROBIOTIC DAILY PO), Take 1 tablet by mouth Daily., Disp: , Rfl:   •  XIFAXAN 550 MG tablet, Take 550 mg by mouth 3 (Three) Times a Day., Disp: , Rfl: 1    Allergies   Allergen Reactions   • Cefprozil    • Codeine    • Sulfa Antibiotics    • Tramadol        Social History     Tobacco Use   • Smoking status: Current Every Day Smoker     Packs/day: 1.00     Types: Cigarettes   • Smokeless  "tobacco: Never Used   Substance Use Topics   • Alcohol use: Yes     Comment: seldom   • Drug use: No       Family History   Problem Relation Age of Onset   • Heart attack Father    • Breast cancer Neg Hx    • Ovarian cancer Neg Hx    • Colon cancer Neg Hx    • Deep vein thrombosis Neg Hx    • Pulmonary embolism Neg Hx        Review of Systems   Constitutional: Negative for appetite change, fatigue, fever and unexpected weight change.   Respiratory: Negative for cough and shortness of breath.    Cardiovascular: Negative for chest pain and palpitations.   Gastrointestinal: Positive for abdominal distention and abdominal pain. Negative for constipation, diarrhea and nausea.   Endocrine: Negative for heat intolerance.   Genitourinary: Positive for dyspareunia. Negative for dysuria, menstrual problem, pelvic pain and vaginal discharge.   Musculoskeletal: Positive for back pain (sciatica). Negative for arthralgias, joint swelling and myalgias.   Skin: Positive for color change. Negative for rash.   Neurological: Negative for headaches.   Psychiatric/Behavioral: Negative for dysphoric mood and sleep disturbance. The patient is not nervous/anxious.    All other systems reviewed and are negative.      /80   Ht 157.5 cm (62\")   Wt 75.3 kg (166 lb)   BMI 30.36 kg/m²     Physical Exam   Constitutional: She is oriented to person, place, and time. She appears well-developed and well-nourished.   HENT:   Head: Normocephalic and atraumatic.   Eyes: Conjunctivae are normal. No scleral icterus.   Neck: Neck supple. No thyromegaly present.   Cardiovascular: Normal rate and regular rhythm.   Pulmonary/Chest: Effort normal and breath sounds normal. Right breast exhibits no inverted nipple, no mass, no nipple discharge, no skin change and no tenderness. Left breast exhibits no inverted nipple, no mass, no nipple discharge, no skin change and no tenderness.   Abdominal: Soft. Bowel sounds are normal. She exhibits no distension " and no mass. There is no tenderness. There is no rebound and no guarding. No hernia.   Genitourinary: Pelvic exam was performed with patient supine. There is no rash, tenderness or lesion on the right labia. There is no rash, tenderness or lesion on the left labia. Right adnexum displays no mass, no tenderness and no fullness. Left adnexum displays no mass, no tenderness and no fullness. There is tenderness in the vagina. No erythema or bleeding in the vagina. No foreign body in the vagina. No signs of injury around the vagina. No vaginal discharge found.   Genitourinary Comments: Normal cuff  + LPS   Neurological: She is alert and oriented to person, place, and time.   Skin: Skin is warm and dry. Lesion noted.        Psychiatric: She has a normal mood and affect. Her behavior is normal. Judgment and thought content normal.   Nursing note and vitals reviewed.         Assessment/Plan    1) GYN HM: normal pap/HPV 6/2017.check pap/HPV  SBE demonstrated and encouraged.  2) STD screening: declines Condoms encouraged.  3) Contraception: s/p TLH  4) SMOKER-  I advised Jeannie of the risks of continuing to use tobacco, and I provided her with tobacco cessation educational materials . During this visit, I spent 4 minutes counseling the patient regarding tobacco cessation., encourage folic acid daily  5) Diet and Exercise discussed  6) Abnormal mole- refer derm  7) C scope UTD 2019, repeat ?   8) MMG-  UTD 9/2019- B1   9)Parts of this document have been copied or forwarded from her previous visits and have been reviewed, updated and edited as indicated.   10) Follow up prn or 1 year       Jeannie was seen today for gynecologic exam.    Diagnoses and all orders for this visit:    Pap smear, low-risk  -     Pap IG, HPV-hr    Screening for condition  -     POC Urinalysis Dipstick    Special screening examination for human papillomavirus (HPV)  -     Pap IG, HPV-hr    Smoker    Encounter for gynecological examination with abnormal  finding    Atypical mole  -     Ambulatory Referral to Dermatology          Soraya Mccabe MD  12/9/19

## 2019-12-11 LAB
CYTOLOGIST CVX/VAG CYTO: NORMAL
CYTOLOGY CVX/VAG DOC CYTO: NORMAL
CYTOLOGY CVX/VAG DOC THIN PREP: NORMAL
DX ICD CODE: NORMAL
HIV 1 & 2 AB SER-IMP: NORMAL
HPV I/H RISK 1 DNA CVX QL PROBE+SIG AMP: NEGATIVE
OTHER STN SPEC: NORMAL
STAT OF ADQ CVX/VAG CYTO-IMP: NORMAL

## 2020-12-10 ENCOUNTER — OFFICE VISIT (OUTPATIENT)
Dept: OBSTETRICS AND GYNECOLOGY | Facility: CLINIC | Age: 50
End: 2020-12-10

## 2020-12-10 VITALS
DIASTOLIC BLOOD PRESSURE: 80 MMHG | BODY MASS INDEX: 30.14 KG/M2 | WEIGHT: 170.1 LBS | HEIGHT: 63 IN | SYSTOLIC BLOOD PRESSURE: 126 MMHG

## 2020-12-10 DIAGNOSIS — R10.32 LLQ PAIN: ICD-10-CM

## 2020-12-10 DIAGNOSIS — Z13.9 SCREENING FOR CONDITION: Primary | ICD-10-CM

## 2020-12-10 DIAGNOSIS — Z01.411 ENCOUNTER FOR GYNECOLOGICAL EXAMINATION WITH ABNORMAL FINDING: ICD-10-CM

## 2020-12-10 DIAGNOSIS — D22.9 MULTIPLE ATYPICAL SKIN MOLES: ICD-10-CM

## 2020-12-10 LAB
BILIRUB BLD-MCNC: NEGATIVE MG/DL
CLARITY, POC: CLEAR
COLOR UR: YELLOW
GLUCOSE UR STRIP-MCNC: NEGATIVE MG/DL
KETONES UR QL: NEGATIVE
LEUKOCYTE EST, POC: NEGATIVE
NITRITE UR-MCNC: NEGATIVE MG/ML
PH UR: 6 [PH] (ref 5–8)
PROT UR STRIP-MCNC: NEGATIVE MG/DL
RBC # UR STRIP: NEGATIVE /UL
SP GR UR: 1 (ref 1–1.03)
UROBILINOGEN UR QL: NORMAL

## 2020-12-10 PROCEDURE — 99396 PREV VISIT EST AGE 40-64: CPT | Performed by: OBSTETRICS & GYNECOLOGY

## 2020-12-10 PROCEDURE — 81002 URINALYSIS NONAUTO W/O SCOPE: CPT | Performed by: OBSTETRICS & GYNECOLOGY

## 2020-12-10 PROCEDURE — 99213 OFFICE O/P EST LOW 20 MIN: CPT | Performed by: OBSTETRICS & GYNECOLOGY

## 2020-12-10 RX ORDER — ALPRAZOLAM 0.5 MG/1
0.5 TABLET, EXTENDED RELEASE ORAL
COMMUNITY
Start: 2017-04-18

## 2020-12-10 RX ORDER — FUROSEMIDE 20 MG/1
TABLET ORAL
COMMUNITY
Start: 2020-11-10 | End: 2023-03-24

## 2020-12-10 RX ORDER — DULOXETIN HYDROCHLORIDE 60 MG/1
1 CAPSULE, DELAYED RELEASE ORAL DAILY
COMMUNITY
Start: 2020-06-25 | End: 2023-03-24

## 2020-12-10 RX ORDER — ALPRAZOLAM 0.5 MG/1
TABLET ORAL
COMMUNITY
Start: 2020-09-25

## 2020-12-10 RX ORDER — NABUMETONE 500 MG/1
1 TABLET, FILM COATED ORAL
COMMUNITY
Start: 2020-06-25 | End: 2021-11-24

## 2020-12-10 NOTE — PROGRESS NOTES
GYN Annual Exam     CC- Here for annual exam.     Jeannie Peacock is a 50 y.o. female established patient who presents for annual well woman exam. She has had a TLH with OC. She says her pain with sex has improved without therapy. CLL has been hit hard by Coivd. She is complaining of some LLQ pain off and on. She has an appt to see GI but it is not for a few months and she is interested in an US to evaluate her ovaries. She has had a recent C scope and MMG at [facility]. She had multiple irregular moles and did not see derm, but is willing to go if she can stay in .   OB History        1    Para   1    Term   1            AB        Living   1       SAB        TAB        Ectopic        Molar        Multiple        Live Births              Obstetric Comments   1              Menarche: 11  Current contraception: status post hysterectomy   HRT: none  History of abnormal Pap smear: yes - s/p cryo with nl f/u  History of abnormal mammogram: no  Family history of uterine, colon or ovarian cancer: no  Family history of breast cancer: no  H/o STDs: none  Gardasil: none  S/P ablation, TLH with OC pelvic pain  Last pap: 2019- normal pap/HPV  DOREEN: none    Health Maintenance   Topic Date Due   • COLONOSCOPY  1969   • Pneumococcal Vaccine 0-64 (1 of 1 - PPSV23) 10/20/1975   • TDAP/TD VACCINES (1 - Tdap) 10/20/1988   • HEPATITIS C SCREENING  2017   • ANNUAL PHYSICAL  2018   • ZOSTER VACCINE (1 of 2) 10/20/2019   • INFLUENZA VACCINE  2020   • Annual Gynecologic Pelvic and Breast Exam  12/10/2020   • MAMMOGRAM  2021   • PAP SMEAR  2022       Past Medical History:   Diagnosis Date   • Abnormal Pap smear of cervix        • Anxiety    • Cervical dysplasia     s/p cryo   • GERD (gastroesophageal reflux disease)    • Hypertension    • IBS (irritable bowel syndrome) 2017   • S/P laparoscopic hysterectomy 2017   • Smoker 2017       Past Surgical History:      Procedure Laterality Date   • APPENDECTOMY     • CHOLECYSTECTOMY     • DILATATION AND CURETTAGE     • ENDOMETRIAL ABLATION     • EXPLORATORY LAPAROTOMY     • GYNECOLOGIC CRYOSURGERY     • HYSTERECTOMY      TLH with OC   • SHOULDER ARTHROSCOPY Right    • TUBAL ABDOMINAL LIGATION           Current Outpatient Medications:   •  albuterol (PROAIR HFA) 108 (90 BASE) MCG/ACT inhaler, ProAir  (90 Base) MCG/ACT Inhalation Aerosol Solution; Patient Sig: ProAir  (90 Base) MCG/ACT Inhalation Aerosol Solution ; 0; 28-Apr-2015; Active, Disp: , Rfl:   •  ALPRAZolam (XANAX) 0.5 MG tablet, TAKE 1/2 TO 1 TABLET BY MOUTH AS NEEDED FOR ANXIETY., Disp: , Rfl:   •  ALPRAZolam XR (XANAX XR) 0.5 MG 24 hr tablet, 0.5 mg., Disp: , Rfl:   •  cyanocobalamin (VITAMIN B-12) 1000 MCG tablet, Take 1 tablet by mouth., Disp: , Rfl:   •  DULoxetine (CYMBALTA) 60 MG capsule, Take 1 capsule by mouth Daily., Disp: , Rfl:   •  fluticasone (FLONASE) 50 MCG/ACT nasal spray, 2 sprays into each nostril Daily., Disp: , Rfl:   •  furosemide (LASIX) 20 MG tablet, TAKE (1) TABLET DAILY AS NEEDED FOR SWELLING, Disp: , Rfl:   •  nabumetone (RELAFEN) 500 MG tablet, Take 1 tablet by mouth., Disp: , Rfl:   •  Probiotic Product (PROBIOTIC DAILY PO), Take 1 tablet by mouth Daily., Disp: , Rfl:   •  XIFAXAN 550 MG tablet, Take 550 mg by mouth 3 (Three) Times a Day., Disp: , Rfl: 1  •  CHANTIX STARTING MONTH FLAQUITO 0.5 MG X 11 & 1 MG X 42 tablet, See Admin Instructions. see package, Disp: , Rfl: 2  •  DEXILANT 60 MG capsule, , Disp: , Rfl:   •  doxycycline (VIBRAMYCIN) 100 MG capsule, TAKE 1 CAPSULE BY MOUTH TWICE A DAY UNTIL FINISHED, Disp: , Rfl: 0  •  LINZESS 145 MCG capsule, , Disp: , Rfl:   •  lisinopril (PRINIVIL,ZESTRIL) 10 MG tablet, Take 10 mg by mouth Daily., Disp: , Rfl:   •  metaxalone (SKELAXIN) 800 MG tablet, Take 800 mg by mouth 2 (Two) Times a Day., Disp: , Rfl:   •  MethylPREDNISolone (MEDROL, FLAQUITO,) 4 MG tablet, Take as directed on package  "instructions., Disp: 1 each, Rfl: 0    Allergies   Allergen Reactions   • Cefprozil    • Codeine    • Sulfa Antibiotics    • Tramadol        Social History     Tobacco Use   • Smoking status: Current Every Day Smoker     Packs/day: 1.00     Types: Cigarettes   • Smokeless tobacco: Never Used   Substance Use Topics   • Alcohol use: Yes     Comment: seldom   • Drug use: No       Family History   Problem Relation Age of Onset   • Heart attack Father    • Breast cancer Neg Hx    • Ovarian cancer Neg Hx    • Colon cancer Neg Hx    • Deep vein thrombosis Neg Hx    • Pulmonary embolism Neg Hx        Review of Systems   Constitutional: Positive for activity change. Negative for appetite change, fatigue, fever and unexpected weight change.   Respiratory: Negative for cough and shortness of breath.    Cardiovascular: Negative for chest pain and palpitations.   Gastrointestinal: Positive for abdominal pain, constipation and diarrhea. Negative for abdominal distention and nausea.   Endocrine: Negative for heat intolerance.   Genitourinary: Positive for pelvic pain. Negative for dyspareunia, dysuria, menstrual problem, vaginal bleeding and vaginal discharge.   Musculoskeletal: Negative for arthralgias, back pain, joint swelling and myalgias.   Skin: Positive for color change. Negative for rash.   Neurological: Negative for headaches.   Psychiatric/Behavioral: Negative for dysphoric mood and sleep disturbance. The patient is not nervous/anxious.    All other systems reviewed and are negative.      /80   Ht 160 cm (63\")   Wt 77.2 kg (170 lb 1.6 oz)   LMP  (LMP Unknown)   Breastfeeding No   BMI 30.13 kg/m²     Physical Exam   Constitutional: She is oriented to person, place, and time. She appears well-developed.   HENT:   Head: Normocephalic and atraumatic.   Eyes: Conjunctivae are normal. No scleral icterus.   Neck: Neck supple. No thyromegaly present.   Cardiovascular: Normal rate and regular rhythm.   Pulmonary/Chest: " Effort normal and breath sounds normal. Right breast exhibits no inverted nipple, no mass, no nipple discharge, no skin change and no tenderness. Left breast exhibits no inverted nipple, no mass, no nipple discharge, no skin change and no tenderness.   Abdominal: Soft. Normal appearance and bowel sounds are normal. She exhibits no distension and no mass. There is no abdominal tenderness. There is no rebound and no guarding. No hernia.   Genitourinary:    Rectum normal.      Pelvic exam was performed with patient supine.   There is no rash, tenderness, lesion or injury on the right labia. There is no rash, tenderness, lesion or injury on the left labia. Right adnexum displays no mass, no tenderness and no fullness. Left adnexum displays no mass, no tenderness and no fullness.    No vaginal discharge, erythema, tenderness or bleeding.   No erythema, tenderness or bleeding in the vagina.    No foreign body in the vagina.      No signs of injury in the vagina.      Genitourinary Comments: Normal cuff       Neurological: She is alert and oriented to person, place, and time.   Skin: Skin is warm and dry. Lesion noted.        Psychiatric: Her behavior is normal. Mood, judgment and thought content normal.   Nursing note and vitals reviewed.         Assessment/Plan    1) GYN HM: normal pap/HPV 12/2019 SBE demonstrated and encouraged.  2) STD screening: declines Condoms encouraged.  3) Contraception: s/p TLH  4) SMOKER-  I advised Jeannie of the risks of continuing to use tobacco, and I provided her with tobacco cessation educational materials . During this visit, I spent 4 minutes counseling the patient regarding tobacco cessation., encourage folic acid daily.   5) Diet and Exercise discussed  6) Abnormal mole- refer derm, pt willing to go to .  7) C scope UTD 2019, repeat ? , get records from [place]  8) MMG-  UTD 9/2020, get records from [place]  9)Parts of this document have been copied or forwarded from her  previous visits and have been reviewed, updated and edited as indicated.   10) LLQ pain- check TVUS and visit. Pt can't stay today for US so will schedule in the next 2 weeks.   11) Parts of this document have been copied or forwarded from her previous visits and have been reviewed, updated and edited as indicated.   12)I saw the patient with a face mask, gloves and eye protection  The patient herself was masked.  Social distancing was observed as appropriate.  13)Follow up US and visit and  1 year annual        Diagnoses and all orders for this visit:    1. Screening for condition (Primary)  -     POC Urinalysis Dipstick    2. Multiple atypical skin moles  -     Ambulatory Referral to Dermatology    3. Encounter for gynecological examination with abnormal finding    4. LLQ pain          Soraya Mccabe MD  12/10/2020

## 2020-12-21 ENCOUNTER — OFFICE VISIT (OUTPATIENT)
Dept: OBSTETRICS AND GYNECOLOGY | Facility: CLINIC | Age: 50
End: 2020-12-21

## 2020-12-21 VITALS
BODY MASS INDEX: 29.55 KG/M2 | HEIGHT: 63 IN | SYSTOLIC BLOOD PRESSURE: 116 MMHG | WEIGHT: 166.8 LBS | DIASTOLIC BLOOD PRESSURE: 80 MMHG

## 2020-12-21 DIAGNOSIS — R14.0 BLOATING: Primary | ICD-10-CM

## 2020-12-21 DIAGNOSIS — R10.32 LLQ PAIN: ICD-10-CM

## 2020-12-21 PROCEDURE — 99213 OFFICE O/P EST LOW 20 MIN: CPT | Performed by: OBSTETRICS & GYNECOLOGY

## 2020-12-21 NOTE — PROGRESS NOTES
"Jeannie Peacock is a 50 y.o. patient who presents for follow up of   Chief Complaint   Patient presents with   • Follow-up     US       49 yo est pt here for TVUS and f/u abdominal bloating. She has LLQ pain off and on and severe gas. Her US today shows a surgically absent uterus and both ovaries appear normal. There is noted to be significant bowel gas and we discussed that her pain is not likely to be gyn in nature. Her US was compared to her last scan on 11/26/18.  She needs to see her GI provider for further workup. She also has a + CHOLO and is seeing rheumatology.       The following portions of the patient's history were reviewed and updated as appropriate: allergies, current medications and problem list.    Review of Systems   Constitutional: Positive for activity change. Negative for appetite change, fatigue, fever and unexpected weight change.   Respiratory: Negative for cough and shortness of breath.    Cardiovascular: Negative for chest pain and palpitations.   Gastrointestinal: Positive for abdominal distention, abdominal pain, constipation and diarrhea. Negative for nausea.   Endocrine: Negative for heat intolerance.   Genitourinary: Positive for pelvic pain. Negative for dyspareunia, dysuria, menstrual problem, vaginal bleeding and vaginal discharge.   Musculoskeletal: Positive for arthralgias and myalgias. Negative for back pain and joint swelling.   Skin: Negative for color change and rash.   Neurological: Negative for headaches.   Psychiatric/Behavioral: Negative for dysphoric mood and sleep disturbance. The patient is not nervous/anxious.    All other systems reviewed and are negative.      /80   Ht 160 cm (62.99\")   Wt 75.7 kg (166 lb 12.8 oz)   LMP  (LMP Unknown)   Breastfeeding No   BMI 29.55 kg/m²     Physical Exam  Vitals signs and nursing note reviewed.   Constitutional:       Appearance: Normal appearance. She is well-developed.   HENT:      Head: Normocephalic and " atraumatic.   Eyes:      General: No scleral icterus.     Conjunctiva/sclera: Conjunctivae normal.   Neck:      Thyroid: No thyromegaly.   Abdominal:      General: There is no distension.      Palpations: Abdomen is soft. There is no mass.      Tenderness: There is abdominal tenderness. There is left CVA tenderness. There is no right CVA tenderness, guarding or rebound.      Hernia: No hernia is present.      Comments: Diffuse TTP lower abdomen   Skin:     General: Skin is warm and dry.   Neurological:      Mental Status: She is alert and oriented to person, place, and time.   Psychiatric:         Mood and Affect: Mood normal.         Behavior: Behavior normal.         Thought Content: Thought content normal.         Judgment: Judgment normal.         A/P:  1. LLQ pain- normal ovaries on US and symptoms sound more GI related. Enc pt to call her GI provider for soonest appt/  2. RHM- RTO 11/2021 annual. Enc pt to get MMG.     Assessment/Plan   Diagnoses and all orders for this visit:    1. Bloating (Primary)    2. LLQ pain                 No follow-ups on file.      Soraya Mccabe MD    12/21/2020  14:25 EST

## 2023-03-24 ENCOUNTER — OFFICE VISIT (OUTPATIENT)
Dept: OBSTETRICS AND GYNECOLOGY | Facility: CLINIC | Age: 53
End: 2023-03-24
Payer: COMMERCIAL

## 2023-03-24 VITALS
HEIGHT: 64 IN | DIASTOLIC BLOOD PRESSURE: 88 MMHG | WEIGHT: 151.8 LBS | BODY MASS INDEX: 25.92 KG/M2 | SYSTOLIC BLOOD PRESSURE: 140 MMHG

## 2023-03-24 DIAGNOSIS — Z01.419 PAP SMEAR, LOW-RISK: ICD-10-CM

## 2023-03-24 DIAGNOSIS — Z12.31 ENCOUNTER FOR SCREENING MAMMOGRAM FOR MALIGNANT NEOPLASM OF BREAST: ICD-10-CM

## 2023-03-24 DIAGNOSIS — B00.9 HSV-1 (HERPES SIMPLEX VIRUS 1) INFECTION: ICD-10-CM

## 2023-03-24 DIAGNOSIS — Z01.419 ROUTINE GYNECOLOGICAL EXAMINATION: Primary | ICD-10-CM

## 2023-03-24 DIAGNOSIS — Z11.51 ENCOUNTER FOR SCREENING FOR HUMAN PAPILLOMAVIRUS (HPV): ICD-10-CM

## 2023-03-24 DIAGNOSIS — R14.0 BLOATING: ICD-10-CM

## 2023-03-24 PROCEDURE — 81002 URINALYSIS NONAUTO W/O SCOPE: CPT | Performed by: OBSTETRICS & GYNECOLOGY

## 2023-03-24 PROCEDURE — 99396 PREV VISIT EST AGE 40-64: CPT | Performed by: OBSTETRICS & GYNECOLOGY

## 2023-03-24 PROCEDURE — 99213 OFFICE O/P EST LOW 20 MIN: CPT | Performed by: OBSTETRICS & GYNECOLOGY

## 2023-03-24 RX ORDER — CYANOCOBALAMIN 1000 UG/ML
1000 INJECTION, SOLUTION INTRAMUSCULAR; SUBCUTANEOUS
COMMUNITY
Start: 2022-12-02

## 2023-03-24 RX ORDER — IBUPROFEN 800 MG/1
TABLET ORAL
COMMUNITY
Start: 2023-01-23 | End: 2023-03-24

## 2023-03-24 RX ORDER — VALACYCLOVIR HYDROCHLORIDE 1 G/1
TABLET, FILM COATED ORAL
COMMUNITY
Start: 2022-11-13 | End: 2023-03-24 | Stop reason: SDUPTHER

## 2023-03-24 RX ORDER — VALACYCLOVIR HYDROCHLORIDE 1 G/1
1000 TABLET, FILM COATED ORAL 2 TIMES DAILY
Qty: 18 TABLET | Refills: 2 | Status: SHIPPED | OUTPATIENT
Start: 2023-03-24 | End: 2023-03-27

## 2023-03-24 RX ORDER — DIAZEPAM 5 MG/1
TABLET ORAL
COMMUNITY
Start: 2023-03-10

## 2023-03-24 RX ORDER — PANTOPRAZOLE SODIUM 40 MG/1
TABLET, DELAYED RELEASE ORAL
COMMUNITY
Start: 2022-10-28

## 2023-03-24 NOTE — PROGRESS NOTES
GYN Annual Exam     CC- Here for annual exam.     Jeannie Peacock is a 52 y.o. female established patient who presents for annual well woman exam. She has had a TLH with OC. She was last seen in 2020.  She uses Valtrex for cold sores  And needs a refill. She also still complains of bloating and anxiety. Her C scope in  was normal. Her US today shows a surgically absent uterus and normal ovaries. There is overlying bowel gas noted. Her US was compared to her last scan on 2023.   OB History        1    Para   1    Term   1            AB        Living   1       SAB        IAB        Ectopic        Molar        Multiple        Live Births              Obstetric Comments   1              Menarche: 11  Current contraception: status post hysterectomy   HRT: none  History of abnormal Pap smear: yes - s/p cryo with nl f/u  History of abnormal mammogram: no  Family history of uterine, colon or ovarian cancer: no  Family history of breast cancer: no  H/o STDs: none  Gardasil: none  S/P ablation, TLH with OC pelvic pain  Last pap: 2019- normal pap/HPV  DOREEN: none    Health Maintenance   Topic Date Due   • COLORECTAL CANCER SCREENING  Never done   • Pneumococcal Vaccine 0-64 (1 - PCV) Never done   • HEPATITIS C SCREENING  Never done   • ANNUAL PHYSICAL  2018   • ZOSTER VACCINE (1 of 2) Never done   • COVID-19 Vaccine (3 - Booster) 2021   • Annual Gynecologic Pelvic and Breast Exam  2021   • INFLUENZA VACCINE  2023   • MAMMOGRAM  10/28/2024   • PAP SMEAR  2026   • TDAP/TD VACCINES (3 - Td or Tdap) 10/11/2031       Past Medical History:   Diagnosis Date   • Abnormal Pap smear of cervix        • Anxiety    • Cervical dysplasia     s/p cryo   • GERD (gastroesophageal reflux disease)    • Herpes    • Hypertension    • IBS (irritable bowel syndrome) 2017   • S/P laparoscopic hysterectomy 2017   • Smoker 2017       Past Surgical History:   Procedure  Laterality Date   • APPENDECTOMY     • CHOLECYSTECTOMY     • DILATATION AND CURETTAGE     • ENDOMETRIAL ABLATION     • EXPLORATORY LAPAROTOMY     • GYNECOLOGIC CRYOSURGERY     • HYSTERECTOMY      TLH with OC   • SHOULDER ARTHROSCOPY Right    • TUBAL ABDOMINAL LIGATION           Current Outpatient Medications:   •  albuterol sulfate  (90 Base) MCG/ACT inhaler, ProAir  (90 Base) MCG/ACT Inhalation Aerosol Solution; Patient Sig: ProAir  (90 Base) MCG/ACT Inhalation Aerosol Solution ; 0; 28-Apr-2015; Active, Disp: , Rfl:   •  ALPRAZolam (XANAX) 0.5 MG tablet, TAKE 1/2 TO 1 TABLET BY MOUTH AS NEEDED FOR ANXIETY., Disp: , Rfl:   •  ALPRAZolam XR (XANAX XR) 0.5 MG 24 hr tablet, 1 tablet., Disp: , Rfl:   •  cyanocobalamin (VITAMIN B-12) 1000 MCG tablet, Take 1 tablet by mouth., Disp: , Rfl:   •  cyanocobalamin 1000 MCG/ML injection, Inject 1 mL into the appropriate muscle as directed by prescriber Every 30 (Thirty) Days., Disp: , Rfl:   •  diazePAM (VALIUM) 5 MG tablet, , Disp: , Rfl:   •  fluticasone (FLONASE) 50 MCG/ACT nasal spray, 2 sprays into the nostril(s) as directed by provider Daily., Disp: , Rfl:   •  pantoprazole (PROTONIX) 40 MG EC tablet, TAKE 1 TABLET BY MOUTH EVERY DAY **DO NOT CRUSH, CHEW, OR SPLIT**, Disp: , Rfl:   •  Probiotic Product (PROBIOTIC DAILY PO), Take 1 tablet by mouth Daily., Disp: , Rfl:     Allergies   Allergen Reactions   • Cefprozil    • Codeine    • Sulfa Antibiotics    • Tramadol        Social History     Tobacco Use   • Smoking status: Every Day     Packs/day: 1.00     Types: Cigarettes   • Smokeless tobacco: Never   Vaping Use   • Vaping Use: Never used   Substance Use Topics   • Alcohol use: Yes     Comment: seldom   • Drug use: No       Family History   Problem Relation Age of Onset   • Heart attack Father    • Breast cancer Neg Hx    • Ovarian cancer Neg Hx    • Colon cancer Neg Hx    • Deep vein thrombosis Neg Hx    • Pulmonary embolism Neg Hx        Review of  "Systems   Constitutional: Negative for appetite change, fatigue, fever and unexpected weight change.   Respiratory: Negative for cough and shortness of breath.    Cardiovascular: Negative for chest pain and palpitations.   Gastrointestinal: Positive for abdominal distention. Negative for abdominal pain, constipation, diarrhea and nausea.   Endocrine: Negative for heat intolerance.   Genitourinary: Negative for dyspareunia, dysuria, menstrual problem, pelvic pain, vaginal bleeding and vaginal discharge.   Musculoskeletal: Negative for arthralgias, back pain, joint swelling and myalgias.   Skin: Negative for color change and rash.   Neurological: Negative for headaches.   Psychiatric/Behavioral: Negative for dysphoric mood and sleep disturbance. The patient is nervous/anxious.    All other systems reviewed and are negative.      /88   Ht 162.6 cm (64\")   Wt 68.9 kg (151 lb 12.8 oz)   LMP  (LMP Unknown)   BMI 26.06 kg/m²     Physical Exam   Constitutional: She is oriented to person, place, and time. She appears well-developed.   HENT:   Head: Normocephalic and atraumatic.   Eyes: Conjunctivae are normal. No scleral icterus.   Neck: No thyromegaly present.   Cardiovascular: Normal rate and regular rhythm.   Pulmonary/Chest: Effort normal and breath sounds normal. Right breast exhibits no inverted nipple, no mass, no nipple discharge, no skin change and no tenderness. Left breast exhibits no inverted nipple, no mass, no nipple discharge, no skin change and no tenderness.   Abdominal: Soft. Normal appearance and bowel sounds are normal. She exhibits no distension and no mass. There is no abdominal tenderness. There is no rebound and no guarding. No hernia.   Genitourinary:    Rectum normal.      Pelvic exam was performed with patient supine.   There is no rash, tenderness, lesion or injury on the right labia. There is no rash, tenderness, lesion or injury on the left labia. Right adnexum displays no mass, no " tenderness and no fullness. Left adnexum displays no mass, no tenderness and no fullness.    No vaginal discharge, erythema, tenderness or bleeding.   No erythema, tenderness or bleeding in the vagina.    No foreign body in the vagina.      No signs of injury in the vagina.      Genitourinary Comments: Uterus and cervix absent  Normal cuff       Neurological: She is alert and oriented to person, place, and time.   Skin: Skin is warm and dry. No lesion noted.   Psychiatric: Her behavior is normal. Mood, judgment and thought content normal.   Nursing note and vitals reviewed.         Assessment/Plan    1) GYN HM: normal pap/HPV 12/2019, check pap/HPV SBE demonstrated and encouraged.  2) STD screening: declines Condoms encouraged.  3) Contraception: s/p TLH  4) SMOKER-  I advised Jeannie of the risks of continuing to use tobacco, and I provided her with tobacco cessation educational materials . During this visit, I spent 5 minutes counseling the patient regarding tobacco cessation., encourage folic acid daily.   5) Diet and Exercise discussed  6) Bloating- normal ovaries on pelvic US. Enc pt to see GI  7) C scope UTD 2020, repeat 10 years oer pt  8) MMG-  UTD 10/2022, B1. Schedule MMG 10/2023  9)\ DEXA- plan age 54.   10) Parts of this document have been copied or forwarded from her previous visits and have been reviewed, updated and edited as indicated.   11)I saw the patient with a face mask, gloves and eye protection  The patient herself was masked.  Social distancing was observed as appropriate.  12)Follow up prn and/or  1 year annual   13)  I spent > 20 minutes on the separately reported service of bloating. . This time is not included in the time used to support the annual E/M service also reported today.         Diagnoses and all orders for this visit:    1. Routine gynecological examination (Primary)  -     POC Urinalysis Dipstick    2. Encounter for screening for human papillomavirus (HPV)  -     IGP, Apt  HPV,rfx 16 / 18,45    3. Pap smear, low-risk  -     IGP, Apt HPV,rfx 16 / 18,45    4. Encounter for screening mammogram for malignant neoplasm of breast  -     Mammo Screening Digital Tomosynthesis Bilateral With CAD; Future    5. HSV-1 (herpes simplex virus 1) infection    6. Bloating    Other orders  -     valACYclovir (VALTREX) 1000 MG tablet; Take 1 tablet by mouth 2 (Two) Times a Day for 3 days.  Dispense: 18 tablet; Refill: 2          Soraya Mccabe MD  3/24/2023

## 2023-03-30 LAB
CYTOLOGIST CVX/VAG CYTO: NORMAL
CYTOLOGY CVX/VAG DOC CYTO: NORMAL
CYTOLOGY CVX/VAG DOC THIN PREP: NORMAL
DX ICD CODE: NORMAL
HIV 1 & 2 AB SER-IMP: NORMAL
HPV I/H RISK 4 DNA CVX QL PROBE+SIG AMP: NEGATIVE
OTHER STN SPEC: NORMAL
STAT OF ADQ CVX/VAG CYTO-IMP: NORMAL

## 2024-08-07 ENCOUNTER — OFFICE VISIT (OUTPATIENT)
Dept: ORTHOPEDIC SURGERY | Facility: CLINIC | Age: 54
End: 2024-08-07
Payer: COMMERCIAL

## 2024-08-07 VITALS
SYSTOLIC BLOOD PRESSURE: 164 MMHG | HEART RATE: 70 BPM | DIASTOLIC BLOOD PRESSURE: 89 MMHG | BODY MASS INDEX: 26.29 KG/M2 | WEIGHT: 148.4 LBS | HEIGHT: 63 IN

## 2024-08-07 DIAGNOSIS — M25.512 LEFT SHOULDER PAIN, UNSPECIFIED CHRONICITY: Primary | ICD-10-CM

## 2024-08-07 DIAGNOSIS — M75.22 BICEPS TENDINITIS OF LEFT UPPER EXTREMITY: ICD-10-CM

## 2024-08-07 DIAGNOSIS — M75.82 TENDINITIS OF LEFT ROTATOR CUFF: ICD-10-CM

## 2024-08-07 DIAGNOSIS — M75.42 SUBACROMIAL IMPINGEMENT OF LEFT SHOULDER: ICD-10-CM

## 2024-08-07 DIAGNOSIS — M19.019 AC JOINT ARTHROPATHY: ICD-10-CM

## 2024-08-07 PROCEDURE — 99204 OFFICE O/P NEW MOD 45 MIN: CPT | Performed by: ORTHOPAEDIC SURGERY

## 2024-08-07 PROCEDURE — 73030 X-RAY EXAM OF SHOULDER: CPT | Performed by: ORTHOPAEDIC SURGERY

## 2024-08-07 RX ORDER — PANTOPRAZOLE SODIUM 40 MG/1
40 TABLET, DELAYED RELEASE ORAL
COMMUNITY
Start: 2024-06-03 | End: 2024-08-14

## 2024-08-07 RX ORDER — ATORVASTATIN CALCIUM 20 MG/1
20 TABLET, FILM COATED ORAL DAILY
COMMUNITY
Start: 2023-10-03 | End: 2024-10-02

## 2024-08-07 RX ORDER — METRONIDAZOLE 250 MG/1
250 TABLET ORAL 4 TIMES DAILY
COMMUNITY
Start: 2024-07-31 | End: 2024-08-14

## 2024-08-07 RX ORDER — ASPIRIN 81 MG/1
1 TABLET, CHEWABLE ORAL DAILY
COMMUNITY
Start: 2024-03-21
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
## 2024-08-30 ENCOUNTER — OFFICE VISIT (OUTPATIENT)
Dept: SPORTS MEDICINE | Facility: CLINIC | Age: 54
End: 2024-08-30
Payer: COMMERCIAL

## 2024-08-30 ENCOUNTER — PATIENT ROUNDING (BHMG ONLY) (OUTPATIENT)
Dept: SPORTS MEDICINE | Facility: CLINIC | Age: 54
End: 2024-08-30
Payer: COMMERCIAL

## 2024-08-30 VITALS
HEART RATE: 83 BPM | HEIGHT: 63 IN | WEIGHT: 148 LBS | TEMPERATURE: 97.2 F | OXYGEN SATURATION: 96 % | DIASTOLIC BLOOD PRESSURE: 72 MMHG | SYSTOLIC BLOOD PRESSURE: 130 MMHG | BODY MASS INDEX: 26.22 KG/M2

## 2024-08-30 DIAGNOSIS — M75.22 BICEPS TENDINITIS OF LEFT SHOULDER: Primary | ICD-10-CM

## 2024-08-30 RX ORDER — PAROXETINE 10 MG/1
10 TABLET, FILM COATED ORAL AS NEEDED
COMMUNITY
Start: 2023-08-19

## 2024-08-30 NOTE — PROGRESS NOTES
Here today for left shoulder bicep tendon sheath injection requested by Dr. Johnson    - Injection Tendon or Ligament    Date/Time: 8/30/2024 1:05 PM    Performed by: Luis Olivarez MD  Authorized by: Luis Olivarez MD  Consent: Verbal consent obtained.  Risks and benefits: risks, benefits and alternatives were discussed  Preparation: Patient was prepped and draped in the usual sterile fashion.  Patient tolerance: patient tolerated the procedure well with no immediate complications  Medications administered: 1 mL lidocaine 1 %, 1 mL triamcinolone acetonide 40 MG/ML  Ultrasound guidance: yes            Diagnoses and all orders for this visit:    Biceps tendinitis of left shoulder  -     - Injection Tendon or Ligament    Other orders  -     Celecoxib (CELEBREX PO); 100 mg.  -     PARoxetine (PAXIL) 10 MG tablet; Take 1 tablet by mouth As Needed.       Ultrasound notable for synovitis in the long head biceps tendon sheath.  Injection tolerated well.

## 2024-08-30 NOTE — PROGRESS NOTES
August 30, 2024    A Ad Infuse Message has been sent to the patient for PATIENT ROUNDING with [facility]

## 2024-09-30 ENCOUNTER — PROCEDURE VISIT (OUTPATIENT)
Dept: SPORTS MEDICINE | Facility: CLINIC | Age: 54
End: 2024-09-30
Payer: COMMERCIAL

## 2024-09-30 VITALS
HEART RATE: 79 BPM | DIASTOLIC BLOOD PRESSURE: 82 MMHG | BODY MASS INDEX: 26.22 KG/M2 | HEIGHT: 63 IN | OXYGEN SATURATION: 99 % | TEMPERATURE: 97.8 F | SYSTOLIC BLOOD PRESSURE: 130 MMHG | WEIGHT: 148 LBS

## 2024-09-30 DIAGNOSIS — M19.019 AC JOINT ARTHROPATHY: Primary | ICD-10-CM

## 2024-09-30 PROCEDURE — 20611 DRAIN/INJ JOINT/BURSA W/US: CPT | Performed by: FAMILY MEDICINE

## 2024-09-30 NOTE — PROGRESS NOTES
Here today for left shoulder AC joint injection    - Medium Joint Arthrocentesis: L acromioclavicular on 9/30/2024 12:30 PM  Details: ultrasound-guided  Medications: 1 mL lidocaine 1 %, 1 mL triamcinolone acetonide 40 MG/ML  Outcome: tolerated well, no immediate complications  Procedure, treatment alternatives, risks and benefits explained, specific risks discussed. Immediately prior to procedure a time out was called to verify the correct patient, procedure, equipment, support staff and site/side marked as required. Patient was prepped and draped in the usual sterile fashion.           Diagnoses and all orders for this visit:    AC joint arthropathy  -     - Medium Joint Arthrocentesis

## 2024-10-01 DIAGNOSIS — M19.019 AC JOINT ARTHROPATHY: Primary | ICD-10-CM

## 2024-10-01 DIAGNOSIS — M75.22 BICEPS TENDINITIS OF LEFT SHOULDER: ICD-10-CM

## 2024-10-16 ENCOUNTER — OFFICE VISIT (OUTPATIENT)
Dept: ORTHOPEDIC SURGERY | Facility: CLINIC | Age: 54
End: 2024-10-16
Payer: COMMERCIAL

## 2024-10-16 VITALS — WEIGHT: 148 LBS | HEIGHT: 63 IN | BODY MASS INDEX: 26.22 KG/M2

## 2024-10-16 DIAGNOSIS — M75.42 SUBACROMIAL IMPINGEMENT OF LEFT SHOULDER: ICD-10-CM

## 2024-10-16 DIAGNOSIS — M75.22 BICEPS TENDINITIS OF LEFT UPPER EXTREMITY: Primary | ICD-10-CM

## 2024-10-16 DIAGNOSIS — M19.019 AC JOINT ARTHROPATHY: ICD-10-CM

## 2024-10-16 DIAGNOSIS — M75.82 TENDINITIS OF LEFT ROTATOR CUFF: ICD-10-CM
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
## 2024-11-08 ENCOUNTER — OFFICE VISIT (OUTPATIENT)
Dept: OBSTETRICS AND GYNECOLOGY | Facility: CLINIC | Age: 54
End: 2024-11-08
Payer: COMMERCIAL

## 2024-11-08 VITALS
DIASTOLIC BLOOD PRESSURE: 88 MMHG | BODY MASS INDEX: 25.69 KG/M2 | HEIGHT: 63 IN | SYSTOLIC BLOOD PRESSURE: 138 MMHG | WEIGHT: 145 LBS

## 2024-11-08 DIAGNOSIS — Z12.31 ENCOUNTER FOR SCREENING MAMMOGRAM FOR MALIGNANT NEOPLASM OF BREAST: ICD-10-CM

## 2024-11-08 DIAGNOSIS — Z01.419 ROUTINE GYNECOLOGICAL EXAMINATION: Primary | ICD-10-CM

## 2024-11-08 RX ORDER — DOXYCYCLINE 100 MG/1
CAPSULE ORAL
COMMUNITY
Start: 2024-10-21 | End: 2024-11-08

## 2024-11-08 RX ORDER — BROMPHENIRAMINE MALEATE, PSEUDOEPHEDRINE HYDROCHLORIDE, AND DEXTROMETHORPHAN HYDROBROMIDE 2; 30; 10 MG/5ML; MG/5ML; MG/5ML
SYRUP ORAL
COMMUNITY
Start: 2024-10-08 | End: 2024-11-08

## 2024-11-08 NOTE — PROGRESS NOTES
GYN Annual Exam     CC- Here for annual exam.     Jeannie Peacock is a 54 y.o. female established patient who presents for annual well woman exam. She has had a TLH with OC.  Her pelvic pain has improved. She is past due for a MMG and wants to have it at [facility]. She is smoking 1/2 PPD.     OB History          1    Para   1    Term   1            AB        Living   1         SAB        IAB        Ectopic        Molar        Multiple        Live Births              Obstetric Comments   1                Menarche: 11  Current contraception: status post hysterectomy   HRT: none  History of abnormal Pap smear: yes - s/p cryo with nl f/u  History of abnormal mammogram: no  Family history of uterine, colon or ovarian cancer: no  Family history of breast cancer: no  H/o STDs: none  Gardasil: none  S/P ablation, TLH with OC pelvic pain  Last pap: 3/2023- normal pap/HPV  DOREEN: none    Health Maintenance   Topic Date Due    Pneumococcal Vaccine 0-64 (1 of 2 - PCV) Never done    ANNUAL PHYSICAL  2018    ZOSTER VACCINE (1 of 2) Never done    Annual Gynecologic Pelvic and Breast Exam  2024    INFLUENZA VACCINE  Never done    COVID-19 Vaccine (3 - - season) 2024    BMI FOLLOWUP  2025    MAMMOGRAM  10/30/2025    PAP SMEAR  2026    TDAP/TD VACCINES (3 - Td or Tdap) 10/11/2031    COLORECTAL CANCER SCREENING  2033    HEPATITIS C SCREENING  Completed       Past Medical History:   Diagnosis Date    Abnormal Pap smear of cervix         Anxiety     Cervical dysplasia     s/p cryo    Fracture of ankle 2years ago    GERD (gastroesophageal reflux disease)     Herpes     Hypertension     IBS (irritable bowel syndrome) 2017    Rotator cuff syndrome     S/P laparoscopic hysterectomy 2017    Smoker 2017       Past Surgical History:   Procedure Laterality Date    APPENDECTOMY      CHOLECYSTECTOMY      DILATATION AND CURETTAGE      ENDOMETRIAL ABLATION       EXPLORATORY LAPAROTOMY      GYNECOLOGIC CRYOSURGERY      HYSTERECTOMY      TLH with OC    SHOULDER ARTHROSCOPY Right     SHOULDER SURGERY  20 years    TUBAL ABDOMINAL LIGATION           Current Outpatient Medications:     albuterol sulfate  (90 Base) MCG/ACT inhaler, ProAir  (90 Base) MCG/ACT Inhalation Aerosol Solution; Patient Sig: ProAir  (90 Base) MCG/ACT Inhalation Aerosol Solution ; 0; 28-Apr-2015; Active, Disp: , Rfl:     aspirin 81 MG chewable tablet, Chew 1 tablet Daily., Disp: , Rfl:     cyanocobalamin (VITAMIN B-12) 1000 MCG tablet, Take 1 tablet by mouth., Disp: , Rfl:     cyanocobalamin 1000 MCG/ML injection, Inject 1 mL into the appropriate muscle as directed by prescriber Every 30 (Thirty) Days., Disp: , Rfl:     fluticasone (FLONASE) 50 MCG/ACT nasal spray, Administer 2 sprays into the nostril(s) as directed by provider Daily., Disp: , Rfl:     pantoprazole (PROTONIX) 40 MG EC tablet, TAKE 1 TABLET BY MOUTH EVERY DAY **DO NOT CRUSH, CHEW, OR SPLIT**, Disp: , Rfl:     PARoxetine (PAXIL) 10 MG tablet, Take 1 tablet by mouth As Needed., Disp: , Rfl:     Probiotic Product (PROBIOTIC DAILY PO), Take 1 tablet by mouth Daily., Disp: , Rfl:     diazePAM (VALIUM) 5 MG tablet, , Disp: , Rfl:     Allergies   Allergen Reactions    Cefprozil     Codeine Itching    Meloxicam Unknown (See Comments)    Sulfa Antibiotics     Tramadol Itching       Social History     Tobacco Use    Smoking status: Every Day     Current packs/day: 0.50     Average packs/day: 0.5 packs/day for 30.0 years (15.0 ttl pk-yrs)     Types: Cigarettes     Passive exposure: Current    Smokeless tobacco: Never   Vaping Use    Vaping status: Never Used   Substance Use Topics    Alcohol use: Not Currently     Comment: seldom    Drug use: No       Family History   Problem Relation Age of Onset    Heart attack Father     Breast cancer Neg Hx     Ovarian cancer Neg Hx     Colon cancer Neg Hx     Deep vein thrombosis Neg Hx      "Pulmonary embolism Neg Hx        Review of Systems   Constitutional:  Negative for appetite change, fatigue, fever and unexpected weight change.   Respiratory:  Negative for cough and shortness of breath.    Cardiovascular:  Negative for chest pain and palpitations.   Gastrointestinal:  Negative for abdominal distention, abdominal pain, constipation, diarrhea and nausea.   Endocrine: Negative for heat intolerance.   Genitourinary:  Negative for dyspareunia, dysuria, menstrual problem, pelvic pain, vaginal bleeding and vaginal discharge.   Musculoskeletal:  Negative for arthralgias, back pain, joint swelling and myalgias.   Skin:  Negative for color change and rash.   Neurological:  Negative for headaches.   Psychiatric/Behavioral:  Negative for dysphoric mood and sleep disturbance. The patient is not nervous/anxious.    All other systems reviewed and are negative.      /88   Ht 160 cm (63\")   Wt 65.8 kg (145 lb)   LMP  (LMP Unknown)   BMI 25.69 kg/m²     Physical Exam   Constitutional: She is oriented to person, place, and time. She appears well-developed.   HENT:   Head: Normocephalic and atraumatic.   Eyes: Conjunctivae are normal. No scleral icterus.   Neck: No thyromegaly present.   Cardiovascular: Normal rate and regular rhythm.   Pulmonary/Chest: Effort normal and breath sounds normal. Right breast exhibits no inverted nipple, no mass, no nipple discharge, no skin change and no tenderness. Left breast exhibits no inverted nipple, no mass, no nipple discharge, no skin change and no tenderness.   Abdominal: Soft. Normal appearance and bowel sounds are normal. She exhibits no distension and no mass. There is no abdominal tenderness. There is no rebound and no guarding. No hernia.   Genitourinary:    Rectum normal.      Pelvic exam was performed with patient supine.   There is no rash, tenderness, lesion or injury on the right labia. There is no rash, tenderness, lesion or injury on the left labia. Right " adnexum displays no mass, no tenderness and no fullness. Left adnexum displays no mass, no tenderness and no fullness.    No vaginal discharge, erythema, tenderness or bleeding.   No erythema, tenderness or bleeding in the vagina.    No foreign body in the vagina.      No signs of injury in the vagina.      Genitourinary Comments: Uterus and cervix absent  Normal cuff       Neurological: She is alert and oriented to person, place, and time.   Skin: Skin is warm and dry. No lesion noted.   Psychiatric: Her behavior is normal. Mood, judgment and thought content normal.   Nursing note and vitals reviewed.         Assessment/Plan    1) GYN HM: normal pap/HPV 3/2023. SBE demonstrated and encouraged.  2) STD screening: declines Condoms encouraged.  3) Contraception: s/p TLH  4) SMOKER-  I advised Jeannie of the risks of continuing to use tobacco, and I provided her with tobacco cessation educational materials . During this visit, I spent < 3  minutes counseling the patient regarding tobacco cessation., encourage folic acid daily.   5) Diet and Exercise discussed  6) Social: no issues  7) C scope UTD 9/2023 repeat 7 years.  8) MMG-  UTD 10/2023, B1. Schedule MMG now, prefers [facility]  9)\ DEXA- UTD 11/2023- nl BMD, repeat in 3-5 years  10) Parts of this document have been copied or forwarded from her previous visits and have been reviewed, updated and edited as indicated.   11)Follow up prn and/or  1 year annual            Diagnoses and all orders for this visit:    1. Routine gynecological examination (Primary)  -     POC Urinalysis Dipstick    2. Encounter for screening mammogram for malignant neoplasm of breast  -     Mammo Screening Digital Tomosynthesis Bilateral With CAD; Future          Soraya Kianna Mccabe MD  11/8/2024

## 2024-12-26 ENCOUNTER — TELEPHONE (OUTPATIENT)
Dept: ORTHOPEDIC SURGERY | Facility: CLINIC | Age: 54
End: 2024-12-26
Payer: COMMERCIAL

## 2025-01-15 ENCOUNTER — OFFICE VISIT (OUTPATIENT)
Dept: ORTHOPEDIC SURGERY | Facility: CLINIC | Age: 55
End: 2025-01-15
Payer: COMMERCIAL

## 2025-01-15 VITALS — HEIGHT: 63 IN | BODY MASS INDEX: 25.69 KG/M2 | WEIGHT: 145 LBS

## 2025-01-15 DIAGNOSIS — M75.22 BICEPS TENDINITIS OF LEFT UPPER EXTREMITY: ICD-10-CM

## 2025-01-15 DIAGNOSIS — M75.82 TENDINITIS OF LEFT ROTATOR CUFF: ICD-10-CM

## 2025-01-15 DIAGNOSIS — M19.019 AC JOINT ARTHROPATHY: ICD-10-CM

## 2025-01-15 DIAGNOSIS — M75.42 SUBACROMIAL IMPINGEMENT OF LEFT SHOULDER: Primary | ICD-10-CM

## 2025-01-15 RX ORDER — NICOTINE 21 MG/24HR
PATCH, TRANSDERMAL 24 HOURS TRANSDERMAL
COMMUNITY
Start: 2024-12-04
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
## 2025-01-23 ENCOUNTER — LAB (OUTPATIENT)
Dept: LAB | Facility: HOSPITAL | Age: 55
End: 2025-01-23
Payer: COMMERCIAL

## 2025-01-23 ENCOUNTER — CONSULT (OUTPATIENT)
Dept: ONCOLOGY | Facility: CLINIC | Age: 55
End: 2025-01-23
Payer: COMMERCIAL

## 2025-01-23 VITALS
OXYGEN SATURATION: 99 % | TEMPERATURE: 97.7 F | HEIGHT: 63 IN | BODY MASS INDEX: 26.19 KG/M2 | WEIGHT: 147.8 LBS | DIASTOLIC BLOOD PRESSURE: 84 MMHG | RESPIRATION RATE: 16 BRPM | SYSTOLIC BLOOD PRESSURE: 133 MMHG | HEART RATE: 78 BPM

## 2025-01-23 DIAGNOSIS — D72.829 LEUKOCYTOSIS, UNSPECIFIED TYPE: Primary | ICD-10-CM

## 2025-01-23 DIAGNOSIS — D72.823 LEUKEMOID REACTION: Primary | ICD-10-CM

## 2025-01-23 LAB
BASOPHILS # BLD AUTO: 0.06 10*3/MM3 (ref 0–0.2)
BASOPHILS NFR BLD AUTO: 0.6 % (ref 0–1.5)
DEPRECATED RDW RBC AUTO: 48.7 FL (ref 37–54)
EOSINOPHIL # BLD AUTO: 0.28 10*3/MM3 (ref 0–0.4)
EOSINOPHIL NFR BLD AUTO: 3 % (ref 0.3–6.2)
ERYTHROCYTE [DISTWIDTH] IN BLOOD BY AUTOMATED COUNT: 15.4 % (ref 12.3–15.4)
HCT VFR BLD AUTO: 41.2 % (ref 34–46.6)
HGB BLD-MCNC: 13.5 G/DL (ref 12–15.9)
IMM GRANULOCYTES # BLD AUTO: 0.04 10*3/MM3 (ref 0–0.05)
IMM GRANULOCYTES NFR BLD AUTO: 0.4 % (ref 0–0.5)
LYMPHOCYTES # BLD AUTO: 2.55 10*3/MM3 (ref 0.7–3.1)
LYMPHOCYTES NFR BLD AUTO: 27.3 % (ref 19.6–45.3)
MCH RBC QN AUTO: 28.4 PG (ref 26.6–33)
MCHC RBC AUTO-ENTMCNC: 32.8 G/DL (ref 31.5–35.7)
MCV RBC AUTO: 86.7 FL (ref 79–97)
MONOCYTES # BLD AUTO: 0.64 10*3/MM3 (ref 0.1–0.9)
MONOCYTES NFR BLD AUTO: 6.8 % (ref 5–12)
NEUTROPHILS NFR BLD AUTO: 5.78 10*3/MM3 (ref 1.7–7)
NEUTROPHILS NFR BLD AUTO: 61.9 % (ref 42.7–76)
NRBC BLD AUTO-RTO: 0 /100 WBC (ref 0–0.2)
PLATELET # BLD AUTO: 336 10*3/MM3 (ref 140–450)
PMV BLD AUTO: 9.7 FL (ref 6–12)
RBC # BLD AUTO: 4.75 10*6/MM3 (ref 3.77–5.28)
WBC NRBC COR # BLD AUTO: 9.35 10*3/MM3 (ref 3.4–10.8)

## 2025-01-23 PROCEDURE — 85025 COMPLETE CBC W/AUTO DIFF WBC: CPT | Performed by: INTERNAL MEDICINE

## 2025-01-23 RX ORDER — ATORVASTATIN CALCIUM 20 MG/1
20 TABLET, FILM COATED ORAL DAILY
COMMUNITY

## 2025-01-23 NOTE — PROGRESS NOTES
Subjective     REASON FOR CONSULTATION:  leukocytosis  Provide an opinion on any further workup or treatment                             REQUESTING PRACTITIONER:  Catherine    RECORDS OBTAINED:  Records of the patients history including those obtained from the referring provider were reviewed and summarized in detail.    HISTORY OF PRESENT ILLNESS:  The patient is a 54 y.o. year old female who is here for an opinion about the above issue.    History of Present Illness   This is a pleasant 54-year-old lady referred for evaluation of leukocytosis.  The patient states she had an upper respiratory infection/bronchitis in October 2024 at which time her white count was 11.3 with mild neutrophilia 8.3.  She was admitted to the hospital for 4 days in December with pneumonia at which time her white count peaked up to the 30,000 range.  She was treated with IV antibiotics.  She is currently feeling well with no fevers chills night sweats dysuria diarrhea or other constitutional type symptoms.  The patient is a smoker.    Past Medical History:   Diagnosis Date    Abnormal Pap smear of cervix     1991    Anxiety     Cervical dysplasia     s/p cryo    Depression     Fracture of ankle 2years ago    GERD (gastroesophageal reflux disease)     Herpes     Hyperlipidemia     Hypertension     IBS (irritable bowel syndrome) 06/23/2017    Rotator cuff syndrome     S/P laparoscopic hysterectomy 06/23/2017    Smoker 06/23/2017        Past Surgical History:   Procedure Laterality Date    APPENDECTOMY      BARIATRIC SURGERY      CARDIAC CATHETERIZATION      CHOLECYSTECTOMY      DILATATION AND CURETTAGE      ENDOMETRIAL ABLATION      EXPLORATORY LAPAROTOMY      GYNECOLOGIC CRYOSURGERY      HYSTERECTOMY      TLH with OC    SHOULDER ARTHROSCOPY Right     SHOULDER SURGERY  20 years    TUBAL ABDOMINAL LIGATION          Current Outpatient Medications on File Prior to Visit   Medication Sig Dispense Refill    albuterol sulfate  (90 Base)  MCG/ACT inhaler ProAir  (90 Base) MCG/ACT Inhalation Aerosol Solution; Patient Sig: ProAir  (90 Base) MCG/ACT Inhalation Aerosol Solution ; 0; 28-Apr-2015; Active      aspirin 81 MG chewable tablet Chew 1 tablet Daily.      atorvastatin (LIPITOR) 20 MG tablet Take 1 tablet by mouth Daily.      cyanocobalamin 1000 MCG/ML injection Inject 1 mL into the appropriate muscle as directed by prescriber Every 30 (Thirty) Days.      diazePAM (VALIUM) 5 MG tablet       fluticasone (FLONASE) 50 MCG/ACT nasal spray Administer 2 sprays into the nostril(s) as directed by provider Daily.      pantoprazole (PROTONIX) 40 MG EC tablet TAKE 1 TABLET BY MOUTH EVERY DAY **DO NOT CRUSH, CHEW, OR SPLIT**      PARoxetine (PAXIL) 10 MG tablet Take 1 tablet by mouth As Needed.      Probiotic Product (PROBIOTIC DAILY PO) Take 1 tablet by mouth Daily.      cyanocobalamin (VITAMIN B-12) 1000 MCG tablet Take 1 tablet by mouth. (Patient not taking: Reported on 1/23/2025)      nicotine (NICODERM CQ) 14 MG/24HR patch APPLY 1 PATCH TRANSDERMALLY DAILY X 14 DAYS (Patient not taking: Reported on 1/23/2025)       No current facility-administered medications on file prior to visit.        ALLERGIES:    Allergies   Allergen Reactions    Cefprozil     Codeine Itching    Meloxicam Unknown (See Comments)    Sulfa Antibiotics     Tramadol Itching        Social History     Socioeconomic History    Marital status:     Number of children: 1    Years of education: 12   Tobacco Use    Smoking status: Every Day     Current packs/day: 0.50     Average packs/day: 0.5 packs/day for 30.0 years (15.0 ttl pk-yrs)     Types: Cigarettes     Passive exposure: Current    Smokeless tobacco: Never   Vaping Use    Vaping status: Never Used   Substance and Sexual Activity    Alcohol use: Not Currently     Comment: seldom    Drug use: No    Sexual activity: Yes     Partners: Male     Birth control/protection: Tubal ligation, Surgical, Hysterectomy      "Comment: [facility]        Family History   Problem Relation Age of Onset    Hypertension Mother     Hyperlipidemia Mother     Depression Mother     Arthritis Mother     Hypertension Father     Hyperlipidemia Father     Heart disease Father     Alcohol abuse Father     Heart attack Father     Hypertension Sister     Breast cancer Neg Hx     Ovarian cancer Neg Hx     Colon cancer Neg Hx     Deep vein thrombosis Neg Hx     Pulmonary embolism Neg Hx         Review of Systems   Constitutional: Negative.    HENT: Negative.     Respiratory: Negative.     Cardiovascular: Negative.    Gastrointestinal: Negative.    Genitourinary: Negative.    Musculoskeletal: Negative.    Neurological: Negative.    Hematological: Negative.    Psychiatric/Behavioral: Negative.            Objective     Vitals:    01/23/25 1446   BP: 133/84   Pulse: 78   Resp: 16   Temp: 97.7 °F (36.5 °C)   TempSrc: Infrared   SpO2: 99%   Weight: 67 kg (147 lb 12.8 oz)   Height: 160 cm (62.99\")   PainSc: 0-No pain         1/23/2025     2:46 PM   Current Status   ECOG score 0       Physical Exam    CONSTITUTIONAL: pleasant well-developed adult woman  HEENT: no icterus, no thrush, moist membranes  CV: RRR, S1S2, no murmur  RESP: cta bilat, no wheezing, no rales  GI: soft, nontender, no splenomegaly, +BS  MUSC: no edema, normal gait  NEURO: alert and oriented x3, normal strength  PSYCH: normal mood and affect    RECENT LABS:  Hematology WBC   Date Value Ref Range Status   01/23/2025 9.35 3.40 - 10.80 10*3/mm3 Final     RBC   Date Value Ref Range Status   01/23/2025 4.75 3.77 - 5.28 10*6/mm3 Final     Hemoglobin   Date Value Ref Range Status   01/23/2025 13.5 12.0 - 15.9 g/dL Final     Hematocrit   Date Value Ref Range Status   01/23/2025 41.2 34.0 - 46.6 % Final     Platelets   Date Value Ref Range Status   01/23/2025 336 140 - 450 10*3/mm3 Final        Lab Results   Component Value Date    GLUCOSE 87 12/09/2024    BUN 10 12/09/2024    CREATININE 0.66 12/09/2024    NA " 135 12/09/2024    K 4.1 12/09/2024     02/13/2017    CALCIUM 8.2 (L) 12/09/2024    PROTEINTOT 7.0 02/13/2017    ALBUMIN 4.30 02/13/2017    ALT 22 02/13/2017    AST 22 02/13/2017    ALKPHOS 77 02/13/2017    BILITOT 0.4 02/13/2017    GLOB 2.7 02/13/2017    AGRATIO 1.6 02/13/2017    BCR 15.2 12/09/2024    ANIONGAP 8 12/09/2024       Assessment & Plan     This is a pleasant 54-year-old lady with mild neutrophilia October 2024 at which time she had bronchitis/upper respiratory infection.  She was admitted to the hospital 4 days in December with pneumonia during which time she had significant leukocytosis in the 30,000 range.  She is symptomatically improved.  Her CBC with differential today is entirely normal supporting a benign/reactive process previously.  I recommended she have a follow-up CT of the chest to make sure her pneumonia resolved and no underlying pulmonary lesions-she is already scheduled for this next week.  Otherwise I recommended she keep up-to-date with her cancer screening procedures with her PCP.  I will see her back on an as-needed basis.

## 2025-01-24 ENCOUNTER — PATIENT ROUNDING (BHMG ONLY) (OUTPATIENT)
Dept: ONCOLOGY | Facility: CLINIC | Age: 55
End: 2025-01-24
Payer: COMMERCIAL

## 2025-01-31 ENCOUNTER — TELEPHONE (OUTPATIENT)
Dept: ORTHOPEDIC SURGERY | Facility: CLINIC | Age: 55
End: 2025-01-31
Payer: COMMERCIAL

## 2025-02-03 PROBLEM — M75.22 BICEPS TENDINITIS OF LEFT UPPER EXTREMITY: Status: ACTIVE | Noted: 2025-01-15

## 2025-02-03 PROBLEM — M19.019 AC JOINT ARTHROPATHY: Status: ACTIVE | Noted: 2025-01-15

## 2025-02-03 PROBLEM — M75.82 TENDINITIS OF LEFT ROTATOR CUFF: Status: ACTIVE | Noted: 2025-01-15

## 2025-02-03 PROBLEM — M75.42 SUBACROMIAL IMPINGEMENT OF LEFT SHOULDER: Status: ACTIVE | Noted: 2025-01-15

## 2025-02-03 RX ORDER — PREGABALIN 150 MG/1
150 CAPSULE ORAL ONCE
OUTPATIENT
Start: 2025-02-03 | End: 2025-02-03

## 2025-02-03 RX ORDER — ACETAMINOPHEN 325 MG/1
1000 TABLET ORAL ONCE
OUTPATIENT
Start: 2025-02-03 | End: 2025-02-03

## 2025-02-27 ENCOUNTER — TELEPHONE (OUTPATIENT)
Dept: ORTHOPEDIC SURGERY | Facility: CLINIC | Age: 55
End: 2025-02-27
Payer: COMMERCIAL

## 2025-02-27 NOTE — TELEPHONE ENCOUNTER
Returned call to patient. Let patient know that I could not fill her paperwork out until 3-17 due to our paperwork policy.     Patient stated she will call back and let me know if she has to submit new paperwork. If she calls back please transfer the call to Paula at the office.     If patient does have to submit new paperwork, I did let her know that she will not have to pay the $25 paperwork fee, as she has already paid it, and I have not filled any paperwork out at this time.

## 2025-02-27 NOTE — TELEPHONE ENCOUNTER
CAN YOU PLEASE GIVE HER A CALL ABOUT HER [facility] PAPERWORK WHEN YOU GET BACK IN THE [place] OFFICE. SHE HAS SOME QUESTIONS.

## 2025-03-10 ENCOUNTER — ANESTHESIA EVENT (OUTPATIENT)
Dept: PERIOP | Facility: HOSPITAL | Age: 55
End: 2025-03-10
Payer: COMMERCIAL

## 2025-03-11 ENCOUNTER — PRE-ADMISSION TESTING (OUTPATIENT)
Dept: PREADMISSION TESTING | Facility: HOSPITAL | Age: 55
End: 2025-03-11
Payer: COMMERCIAL

## 2025-03-11 VITALS
SYSTOLIC BLOOD PRESSURE: 134 MMHG | RESPIRATION RATE: 18 BRPM | DIASTOLIC BLOOD PRESSURE: 82 MMHG | OXYGEN SATURATION: 99 % | BODY MASS INDEX: 26.78 KG/M2 | HEIGHT: 62 IN | WEIGHT: 145.5 LBS | HEART RATE: 75 BPM

## 2025-03-11 DIAGNOSIS — M75.22 BICEPS TENDINITIS OF LEFT UPPER EXTREMITY: ICD-10-CM

## 2025-03-11 DIAGNOSIS — M75.82 TENDINITIS OF LEFT ROTATOR CUFF: ICD-10-CM

## 2025-03-11 DIAGNOSIS — M19.019 AC JOINT ARTHROPATHY: ICD-10-CM

## 2025-03-11 DIAGNOSIS — M75.42 SUBACROMIAL IMPINGEMENT OF LEFT SHOULDER: ICD-10-CM

## 2025-03-11 LAB
ANION GAP SERPL CALCULATED.3IONS-SCNC: 12.1 MMOL/L (ref 5–15)
APTT PPP: 28.6 SECONDS (ref 24.3–38.1)
BASOPHILS # BLD AUTO: 0.07 10*3/MM3 (ref 0–0.2)
BASOPHILS NFR BLD AUTO: 0.8 % (ref 0–1.5)
BUN SERPL-MCNC: 7 MG/DL (ref 6–20)
BUN/CREAT SERPL: 11.3 (ref 7–25)
CALCIUM SPEC-SCNC: 9.1 MG/DL (ref 8.6–10.5)
CHLORIDE SERPL-SCNC: 104 MMOL/L (ref 98–107)
CO2 SERPL-SCNC: 22.9 MMOL/L (ref 22–29)
CREAT SERPL-MCNC: 0.62 MG/DL (ref 0.57–1)
DEPRECATED RDW RBC AUTO: 44.6 FL (ref 37–54)
EGFRCR SERPLBLD CKD-EPI 2021: 105.3 ML/MIN/1.73
EOSINOPHIL # BLD AUTO: 0.17 10*3/MM3 (ref 0–0.4)
EOSINOPHIL NFR BLD AUTO: 2 % (ref 0.3–6.2)
ERYTHROCYTE [DISTWIDTH] IN BLOOD BY AUTOMATED COUNT: 14 % (ref 12.3–15.4)
GLUCOSE SERPL-MCNC: 70 MG/DL (ref 65–99)
HBA1C MFR BLD: 4.89 % (ref 4.8–5.6)
HCT VFR BLD AUTO: 40.4 % (ref 34–46.6)
HGB BLD-MCNC: 13 G/DL (ref 12–15.9)
IMM GRANULOCYTES # BLD AUTO: 0.06 10*3/MM3 (ref 0–0.05)
IMM GRANULOCYTES NFR BLD AUTO: 0.7 % (ref 0–0.5)
INR PPP: 0.9 (ref 0.9–1.1)
LYMPHOCYTES # BLD AUTO: 1.9 10*3/MM3 (ref 0.7–3.1)
LYMPHOCYTES NFR BLD AUTO: 22.6 % (ref 19.6–45.3)
MCH RBC QN AUTO: 28.1 PG (ref 26.6–33)
MCHC RBC AUTO-ENTMCNC: 32.2 G/DL (ref 31.5–35.7)
MCV RBC AUTO: 87.4 FL (ref 79–97)
MONOCYTES # BLD AUTO: 0.73 10*3/MM3 (ref 0.1–0.9)
MONOCYTES NFR BLD AUTO: 8.7 % (ref 5–12)
NEUTROPHILS NFR BLD AUTO: 5.47 10*3/MM3 (ref 1.7–7)
NEUTROPHILS NFR BLD AUTO: 65.2 % (ref 42.7–76)
NRBC BLD AUTO-RTO: 0 /100 WBC (ref 0–0.2)
PLATELET # BLD AUTO: 364 10*3/MM3 (ref 140–450)
PMV BLD AUTO: 9.8 FL (ref 6–12)
POTASSIUM SERPL-SCNC: 3.5 MMOL/L (ref 3.5–5.2)
PROTHROMBIN TIME: 12.6 SECONDS (ref 12.1–15)
RBC # BLD AUTO: 4.62 10*6/MM3 (ref 3.77–5.28)
SODIUM SERPL-SCNC: 139 MMOL/L (ref 136–145)
WBC NRBC COR # BLD AUTO: 8.4 10*3/MM3 (ref 3.4–10.8)

## 2025-03-11 PROCEDURE — 80048 BASIC METABOLIC PNL TOTAL CA: CPT

## 2025-03-11 PROCEDURE — 85730 THROMBOPLASTIN TIME PARTIAL: CPT

## 2025-03-11 PROCEDURE — 85025 COMPLETE CBC W/AUTO DIFF WBC: CPT

## 2025-03-11 PROCEDURE — 85610 PROTHROMBIN TIME: CPT

## 2025-03-11 PROCEDURE — 83036 HEMOGLOBIN GLYCOSYLATED A1C: CPT

## 2025-03-11 PROCEDURE — 36415 COLL VENOUS BLD VENIPUNCTURE: CPT

## 2025-03-11 RX ORDER — LEVOFLOXACIN 500 MG/1
500 TABLET, FILM COATED ORAL DAILY
COMMUNITY

## 2025-03-11 RX ORDER — FLUTICASONE FUROATE, UMECLIDINIUM BROMIDE AND VILANTEROL TRIFENATATE 200; 62.5; 25 UG/1; UG/1; UG/1
1 POWDER RESPIRATORY (INHALATION) DAILY
COMMUNITY

## 2025-03-11 NOTE — DISCHARGE INSTRUCTIONS
PRE-ADMISSION TESTING INSTRUCTIONS FOR ADULTS    Take these medications the morning of surgery with a small sip of water: albuterol inhaler, atorvastatin, flonase, and pantoprazole    Do not take any insulin or diabetes medications the morning of surgery.    No aspirin, advil, aleve, ibuprofen, naproxen, diet pills, decongestants, or herbal/vitamins for a week prior to surgery.       Tylenol/Acetaminophen is okay to take if needed.    General Instructions:    DO NOT EAT SOLID FOOD AFTER MIDNIGHT THE NIGHT BEFORE SURGERY. No gum, mints, or hard candy after midnight the night before surgery.  You may drink clear liquids the day of surgery up until 2 hours before your arrival time.  Clear liquids are liquids you can see through. Nothing RED in color.    Plain water    Sports drinks      Gelatin (Jell-O)  Fruit juices without pulp such as white grape juice and apple juice  Popsicles that contain no fruit or yogurt  Tea or coffee (no cream or milk added)    It is beneficial for you to have a clear drink that contains carbohydrates 2 hours before your arrival time.  We suggest a 20 ounce bottle of Gatorade or Powerade for non-diabetic patients or a 20 ounce bottle of Gatorade Zero or Powerade Zero for diabetic patients.     Patients who avoid smoking, chewing tobacco and alcohol for 4 weeks prior to surgery have a reduced risk of post-operative complications.  If at all possible, quit smoking as many days before surgery as you can.    Do not smoke, use chewing tobacco or drink alcohol the day of surgery    Bring your C-PAP/ BI-PAP machine if you use one.  Wear clean comfortable clothes.  Do not wear contact lenses, lotion, deodorant, or make-up.  Bring a case for your glasses if applicable. You may brush your teeth the morning of surgery.  You may wear dentures/partials, do not put adhesive/glue on them.  Leave all other jewelry and valuables at home.      Preventing a Surgical Site Infection:    Shower the night before  and on the morning of surgery using the chlorhexidine soap you were given.  Use a clean washcloth with the soap.  Place clean sheets on your bed after showering the night before surgery. Do not use the CHG soap on your hair, face, or private areas. Wash your body gently for five (5) minutes. Do not scrub your skin.  Dry with a clean towel and dress in clean clothing.  Do not shave the surgical area for 10 days-2 weeks prior to surgery  because the razor can irritate skin and make it easier to develop an infection.  Make sure you, your family, and all healthcare providers clean their hands with soap and water or an alcohol based hand  before caring for you or your wound.      Day of surgery:    Your surgeon’s office will advise you of your arrival time for the day of surgery.    Upon arrival, a Pre-op nurse and Anesthesia provider will review your health history, obtain vital signs, and answer questions you may have. The anesthesia provider will also discuss the type of anesthesia that will be needed for your procedure, which may include general anesthesia. The only belongings needed at this time will be your home medications and if applicable your C-PAP/BI-PAP machine.  If you are staying overnight your family can leave the rest of your belongings in the car and bring them to your room later.  A Pre-op nurse will start an IV and you may receive medication in preparation for surgery, including something to help you relax.  Your family will be able to see you in the Pre-op area.  While you are in surgery your family should notify the waiting room  if they leave the waiting room area and provide a contact phone number.    IF you have any questions, you can call the Pre-Admission Department at [phone] or your surgeon's office.  Notify your surgeon if  you become sick, have a fever, productive cough, or cannot be here the day of surgery    Please be aware that surgery does come with  discomfort.  We want to make every effort to control your discomfort so please discuss any uncontrolled symptoms with your nurse.   Your doctor will most likely have prescribed pain medications.      If you are going home after surgery, you will receive individualized written care instructions before being discharged.  A responsible adult (over the age of 18) must drive you to and from the hospital on the day of your surgery and stay with you for 24 hours after anesthesia.    If you are staying overnight following surgery, you will be transported to your hospital room following the recovery period.  [facility] has all private rooms.    You may receive a survey regarding the care you received. Your feedback is very important and will be used to collect the necessary data to help us to continue to provide excellent care.     Deductibles and co-payments are collected on the day of service. Please be prepared to pay the required co-pay, deductible or deposit on the day of service as defined by your plan.

## 2025-03-11 NOTE — PAT
Pt here for PAT visit.  Pre-op tests completed, chg soap given, and instructions reviewed.  Instructed clears until 2 hrs prior to arrival time, voiced understanding. Benzoyl peroxide handout provided to the patient and educated on. Patient educated where she can  benzoyl peroxide and how to use prior to surgery. Patient is aware and in agreement with her treatment plan.

## 2025-03-21 ENCOUNTER — APPOINTMENT (OUTPATIENT)
Dept: ULTRASOUND IMAGING | Facility: HOSPITAL | Age: 55
End: 2025-03-21
Payer: COMMERCIAL

## 2025-03-21 ENCOUNTER — HOSPITAL ENCOUNTER (OUTPATIENT)
Facility: HOSPITAL | Age: 55
Setting detail: HOSPITAL OUTPATIENT SURGERY
Discharge: HOME | End: 2025-03-21
Attending: ORTHOPAEDIC SURGERY | Admitting: ORTHOPAEDIC SURGERY
Payer: COMMERCIAL

## 2025-03-21 ENCOUNTER — ANESTHESIA (OUTPATIENT)
Dept: PERIOP | Facility: HOSPITAL | Age: 55
End: 2025-03-21
Payer: COMMERCIAL

## 2025-03-21 VITALS
BODY MASS INDEX: 26.7 KG/M2 | HEART RATE: 84 BPM | DIASTOLIC BLOOD PRESSURE: 84 MMHG | TEMPERATURE: 98 F | RESPIRATION RATE: 17 BRPM | OXYGEN SATURATION: 97 % | SYSTOLIC BLOOD PRESSURE: 145 MMHG | WEIGHT: 146 LBS

## 2025-03-21 DIAGNOSIS — M75.82 TENDINITIS OF LEFT ROTATOR CUFF: ICD-10-CM

## 2025-03-21 DIAGNOSIS — M75.22 BICEPS TENDINITIS OF LEFT UPPER EXTREMITY: ICD-10-CM

## 2025-03-21 DIAGNOSIS — M19.019 AC JOINT ARTHROPATHY: ICD-10-CM

## 2025-03-21 DIAGNOSIS — M75.42 SUBACROMIAL IMPINGEMENT OF LEFT SHOULDER: ICD-10-CM

## 2025-03-21 PROCEDURE — 25010000002 ONDANSETRON PER 1 MG: Performed by: NURSE ANESTHETIST, CERTIFIED REGISTERED

## 2025-03-21 PROCEDURE — C1713 ANCHOR/SCREW BN/BN,TIS/BN: HCPCS | Performed by: ORTHOPAEDIC SURGERY

## 2025-03-21 PROCEDURE — 25010000002 FENTANYL CITRATE (PF) 50 MCG/ML SOLUTION: Performed by: NURSE ANESTHETIST, CERTIFIED REGISTERED

## 2025-03-21 PROCEDURE — 25810000003 LACTATED RINGERS PER 1000 ML: Performed by: NURSE ANESTHETIST, CERTIFIED REGISTERED

## 2025-03-21 PROCEDURE — 25010000002 LIDOCAINE 1% - EPINEPHRINE 1:100000 1 %-1:100000 SOLUTION: Performed by: ORTHOPAEDIC SURGERY

## 2025-03-21 PROCEDURE — 25010000002 SUCCINYLCHOLINE PER 20 MG: Performed by: NURSE ANESTHETIST, CERTIFIED REGISTERED

## 2025-03-21 PROCEDURE — 23430 REPAIR BICEPS TENDON: CPT | Performed by: ORTHOPAEDIC SURGERY

## 2025-03-21 PROCEDURE — 25010000002 BUPIVACAINE (PF) 0.5 % SOLUTION: Performed by: NURSE ANESTHETIST, CERTIFIED REGISTERED

## 2025-03-21 PROCEDURE — 25010000002 CEFAZOLIN PER 500 MG: Performed by: ORTHOPAEDIC SURGERY

## 2025-03-21 PROCEDURE — 23430 REPAIR BICEPS TENDON: CPT | Performed by: SPECIALIST/TECHNOLOGIST, OTHER

## 2025-03-21 PROCEDURE — 25010000002 SUGAMMADEX 200 MG/2ML SOLUTION: Performed by: NURSE ANESTHETIST, CERTIFIED REGISTERED

## 2025-03-21 PROCEDURE — 29823 SHO ARTHRS SRG XTNSV DBRDMT: CPT | Performed by: SPECIALIST/TECHNOLOGIST, OTHER

## 2025-03-21 PROCEDURE — 25010000002 PROPOFOL 200 MG/20ML EMULSION: Performed by: NURSE ANESTHETIST, CERTIFIED REGISTERED

## 2025-03-21 PROCEDURE — 25010000002 MIDAZOLAM PER 1MG: Performed by: NURSE ANESTHETIST, CERTIFIED REGISTERED

## 2025-03-21 PROCEDURE — 29823 SHO ARTHRS SRG XTNSV DBRDMT: CPT | Performed by: ORTHOPAEDIC SURGERY

## 2025-03-21 PROCEDURE — 25010000002 LIDOCAINE PF 2% 2 % SOLUTION: Performed by: NURSE ANESTHETIST, CERTIFIED REGISTERED

## 2025-03-21 PROCEDURE — 25010000002 DEXAMETHASONE PER 1 MG: Performed by: NURSE ANESTHETIST, CERTIFIED REGISTERED

## 2025-03-21 PROCEDURE — 25010000002 ALBUMIN HUMAN 5% PER 50 ML: Performed by: NURSE ANESTHETIST, CERTIFIED REGISTERED

## 2025-03-21 PROCEDURE — 25810000003 SODIUM CHLORIDE PER 500 ML: Performed by: ORTHOPAEDIC SURGERY

## 2025-03-21 PROCEDURE — P9041 ALBUMIN (HUMAN),5%, 50ML: HCPCS | Performed by: NURSE ANESTHETIST, CERTIFIED REGISTERED

## 2025-03-21 DEVICE — 2.8MM Q-FIX ALL SUTURE ANCHOR
Type: IMPLANTABLE DEVICE | Site: SHOULDER | Status: FUNCTIONAL
Brand: Q-FIX

## 2025-03-21 RX ORDER — MAGNESIUM HYDROXIDE 1200 MG/15ML
LIQUID ORAL AS NEEDED
Status: DISCONTINUED | OUTPATIENT
Start: 2025-03-21 | End: 2025-03-21 | Stop reason: HOSPADM

## 2025-03-21 RX ORDER — BUPIVACAINE HYDROCHLORIDE 5 MG/ML
INJECTION, SOLUTION EPIDURAL; INTRACAUDAL
Status: COMPLETED | OUTPATIENT
Start: 2025-03-21 | End: 2025-03-21

## 2025-03-21 RX ORDER — ALBUMIN HUMAN 50 G/1000ML
SOLUTION INTRAVENOUS CONTINUOUS PRN
Status: DISCONTINUED | OUTPATIENT
Start: 2025-03-21 | End: 2025-03-21 | Stop reason: SURG

## 2025-03-21 RX ORDER — ONDANSETRON 2 MG/ML
4 INJECTION INTRAMUSCULAR; INTRAVENOUS ONCE AS NEEDED
Status: COMPLETED | OUTPATIENT
Start: 2025-03-21 | End: 2025-03-21

## 2025-03-21 RX ORDER — ONDANSETRON 2 MG/ML
4 INJECTION INTRAMUSCULAR; INTRAVENOUS ONCE AS NEEDED
Status: DISCONTINUED | OUTPATIENT
Start: 2025-03-21 | End: 2025-03-21 | Stop reason: HOSPADM

## 2025-03-21 RX ORDER — ROCURONIUM BROMIDE 10 MG/ML
INJECTION, SOLUTION INTRAVENOUS AS NEEDED
Status: DISCONTINUED | OUTPATIENT
Start: 2025-03-21 | End: 2025-03-21 | Stop reason: SURG

## 2025-03-21 RX ORDER — ACETAMINOPHEN 500 MG
1000 TABLET ORAL ONCE
Status: COMPLETED | OUTPATIENT
Start: 2025-03-21 | End: 2025-03-21

## 2025-03-21 RX ORDER — SODIUM CHLORIDE, SODIUM LACTATE, POTASSIUM CHLORIDE, CALCIUM CHLORIDE 600; 310; 30; 20 MG/100ML; MG/100ML; MG/100ML; MG/100ML
100 INJECTION, SOLUTION INTRAVENOUS ONCE
Status: DISCONTINUED | OUTPATIENT
Start: 2025-03-21 | End: 2025-03-21 | Stop reason: HOSPADM

## 2025-03-21 RX ORDER — EPHEDRINE SULFATE 50 MG/ML
INJECTION INTRAVENOUS AS NEEDED
Status: DISCONTINUED | OUTPATIENT
Start: 2025-03-21 | End: 2025-03-21 | Stop reason: SURG

## 2025-03-21 RX ORDER — LIDOCAINE HYDROCHLORIDE 20 MG/ML
INJECTION, SOLUTION EPIDURAL; INFILTRATION; INTRACAUDAL; PERINEURAL
Status: COMPLETED | OUTPATIENT
Start: 2025-03-21 | End: 2025-03-21

## 2025-03-21 RX ORDER — SUCCINYLCHOLINE CHLORIDE 20 MG/ML
INJECTION INTRAMUSCULAR; INTRAVENOUS AS NEEDED
Status: DISCONTINUED | OUTPATIENT
Start: 2025-03-21 | End: 2025-03-21 | Stop reason: SURG

## 2025-03-21 RX ORDER — MIDAZOLAM HYDROCHLORIDE 2 MG/2ML
1 INJECTION, SOLUTION INTRAMUSCULAR; INTRAVENOUS
Status: COMPLETED | OUTPATIENT
Start: 2025-03-21 | End: 2025-03-21

## 2025-03-21 RX ORDER — DEXMEDETOMIDINE HYDROCHLORIDE 100 UG/ML
INJECTION, SOLUTION INTRAVENOUS
Status: COMPLETED | OUTPATIENT
Start: 2025-03-21 | End: 2025-03-21

## 2025-03-21 RX ORDER — FENTANYL CITRATE 50 UG/ML
INJECTION, SOLUTION INTRAMUSCULAR; INTRAVENOUS AS NEEDED
Status: DISCONTINUED | OUTPATIENT
Start: 2025-03-21 | End: 2025-03-21 | Stop reason: SURG

## 2025-03-21 RX ORDER — SENNOSIDES A AND B 8.6 MG/1
1 TABLET, FILM COATED ORAL NIGHTLY
Qty: 20 TABLET | Refills: 0 | Status: SHIPPED | OUTPATIENT
Start: 2025-03-21

## 2025-03-21 RX ORDER — FENTANYL CITRATE 50 UG/ML
25 INJECTION, SOLUTION INTRAMUSCULAR; INTRAVENOUS
Status: DISCONTINUED | OUTPATIENT
Start: 2025-03-21 | End: 2025-03-21 | Stop reason: HOSPADM

## 2025-03-21 RX ORDER — SODIUM CHLORIDE 0.9 % (FLUSH) 0.9 %
10 SYRINGE (ML) INJECTION AS NEEDED
Status: DISCONTINUED | OUTPATIENT
Start: 2025-03-21 | End: 2025-03-21 | Stop reason: HOSPADM

## 2025-03-21 RX ORDER — PROPOFOL 10 MG/ML
INJECTION, EMULSION INTRAVENOUS AS NEEDED
Status: DISCONTINUED | OUTPATIENT
Start: 2025-03-21 | End: 2025-03-21 | Stop reason: SURG

## 2025-03-21 RX ORDER — ONDANSETRON 4 MG/1
4 TABLET, FILM COATED ORAL EVERY 8 HOURS PRN
Qty: 30 TABLET | Refills: 0 | Status: SHIPPED | OUTPATIENT
Start: 2025-03-21

## 2025-03-21 RX ORDER — DEXAMETHASONE SODIUM PHOSPHATE 4 MG/ML
8 INJECTION, SOLUTION INTRA-ARTICULAR; INTRALESIONAL; INTRAMUSCULAR; INTRAVENOUS; SOFT TISSUE ONCE AS NEEDED
Status: COMPLETED | OUTPATIENT
Start: 2025-03-21 | End: 2025-03-21

## 2025-03-21 RX ORDER — HYDROCODONE BITARTRATE AND ACETAMINOPHEN 7.5; 325 MG/1; MG/1
1 TABLET ORAL EVERY 6 HOURS PRN
Qty: 30 TABLET | Refills: 0 | Status: SHIPPED | OUTPATIENT
Start: 2025-03-21

## 2025-03-21 RX ORDER — PREGABALIN 75 MG/1
150 CAPSULE ORAL ONCE
Status: COMPLETED | OUTPATIENT
Start: 2025-03-21 | End: 2025-03-21

## 2025-03-21 RX ORDER — SODIUM CHLORIDE 0.9 % (FLUSH) 0.9 %
10 SYRINGE (ML) INJECTION EVERY 12 HOURS SCHEDULED
Status: DISCONTINUED | OUTPATIENT
Start: 2025-03-21 | End: 2025-03-21 | Stop reason: HOSPADM

## 2025-03-21 RX ORDER — SODIUM CHLORIDE, SODIUM LACTATE, POTASSIUM CHLORIDE, CALCIUM CHLORIDE 600; 310; 30; 20 MG/100ML; MG/100ML; MG/100ML; MG/100ML
9 INJECTION, SOLUTION INTRAVENOUS CONTINUOUS PRN
Status: DISCONTINUED | OUTPATIENT
Start: 2025-03-21 | End: 2025-03-21 | Stop reason: HOSPADM

## 2025-03-21 RX ORDER — LIDOCAINE HYDROCHLORIDE AND EPINEPHRINE 10; 10 MG/ML; UG/ML
INJECTION, SOLUTION INFILTRATION; PERINEURAL AS NEEDED
Status: DISCONTINUED | OUTPATIENT
Start: 2025-03-21 | End: 2025-03-21 | Stop reason: HOSPADM

## 2025-03-21 RX ORDER — SODIUM CHLORIDE 9 MG/ML
INJECTION, SOLUTION INTRAVENOUS AS NEEDED
Status: DISCONTINUED | OUTPATIENT
Start: 2025-03-21 | End: 2025-03-21 | Stop reason: HOSPADM

## 2025-03-21 RX ORDER — FAMOTIDINE 10 MG/ML
20 INJECTION, SOLUTION INTRAVENOUS
Status: COMPLETED | OUTPATIENT
Start: 2025-03-21 | End: 2025-03-21

## 2025-03-21 RX ADMIN — EPHEDRINE SULFATE 5 MG: 50 INJECTION INTRAVENOUS at 12:51

## 2025-03-21 RX ADMIN — MIDAZOLAM HYDROCHLORIDE 1 MG: 1 INJECTION, SOLUTION INTRAMUSCULAR; INTRAVENOUS at 10:01

## 2025-03-21 RX ADMIN — FENTANYL CITRATE 25 MCG: 50 INJECTION, SOLUTION INTRAMUSCULAR; INTRAVENOUS at 14:16

## 2025-03-21 RX ADMIN — CEFAZOLIN 2000 MG: 2 INJECTION, POWDER, FOR SOLUTION INTRAVENOUS at 12:06

## 2025-03-21 RX ADMIN — SODIUM CHLORIDE, SODIUM LACTATE, POTASSIUM CHLORIDE, AND CALCIUM CHLORIDE 9 ML/HR: 600; 310; 30; 20 INJECTION, SOLUTION INTRAVENOUS at 09:41

## 2025-03-21 RX ADMIN — FENTANYL CITRATE 25 MCG: 50 INJECTION, SOLUTION INTRAMUSCULAR; INTRAVENOUS at 14:06

## 2025-03-21 RX ADMIN — DEXMEDETOMIDINE 30 MCG: 100 INJECTION, SOLUTION, CONCENTRATE INTRAVENOUS at 10:12

## 2025-03-21 RX ADMIN — PROPOFOL 150 MG: 10 INJECTION, EMULSION INTRAVENOUS at 12:01

## 2025-03-21 RX ADMIN — ROCURONIUM 40 MG: 50 INJECTION, SOLUTION INTRAVENOUS at 12:21

## 2025-03-21 RX ADMIN — ONDANSETRON 4 MG: 2 INJECTION INTRAMUSCULAR; INTRAVENOUS at 09:39

## 2025-03-21 RX ADMIN — SUCCINYLCHOLINE CHLORIDE 100 MG: 20 INJECTION, SOLUTION INTRAMUSCULAR; INTRAVENOUS at 12:02

## 2025-03-21 RX ADMIN — LIDOCAINE HYDROCHLORIDE 50 ML: 20 INJECTION, SOLUTION EPIDURAL; INFILTRATION; INTRACAUDAL; PERINEURAL at 12:01

## 2025-03-21 RX ADMIN — FAMOTIDINE 20 MG: 10 INJECTION INTRAVENOUS at 09:38

## 2025-03-21 RX ADMIN — DEXAMETHASONE SODIUM PHOSPHATE 8 MG: 4 INJECTION, SOLUTION INTRAMUSCULAR; INTRAVENOUS at 09:38

## 2025-03-21 RX ADMIN — PROPOFOL 50 MG: 10 INJECTION, EMULSION INTRAVENOUS at 12:05

## 2025-03-21 RX ADMIN — LIDOCAINE HYDROCHLORIDE 3 ML: 20 INJECTION, SOLUTION EPIDURAL; INFILTRATION; INTRACAUDAL; PERINEURAL at 10:12

## 2025-03-21 RX ADMIN — PREGABALIN 150 MG: 75 CAPSULE ORAL at 09:39

## 2025-03-21 RX ADMIN — FENTANYL CITRATE 50 MCG: 50 INJECTION, SOLUTION INTRAMUSCULAR; INTRAVENOUS at 12:06

## 2025-03-21 RX ADMIN — BUPIVACAINE HYDROCHLORIDE 15 ML: 5 INJECTION, SOLUTION EPIDURAL; INTRACAUDAL; PERINEURAL at 10:12

## 2025-03-21 RX ADMIN — MIDAZOLAM HYDROCHLORIDE 1 MG: 1 INJECTION, SOLUTION INTRAMUSCULAR; INTRAVENOUS at 10:06

## 2025-03-21 RX ADMIN — ALBUMIN HUMAN: 0.05 INJECTION, SOLUTION INTRAVENOUS at 12:50

## 2025-03-21 RX ADMIN — SODIUM CHLORIDE, SODIUM LACTATE, POTASSIUM CHLORIDE, AND CALCIUM CHLORIDE: 600; 310; 30; 20 INJECTION, SOLUTION INTRAVENOUS at 13:37

## 2025-03-21 RX ADMIN — ACETAMINOPHEN 1000 MG: 500 TABLET, FILM COATED ORAL at 09:39

## 2025-03-21 RX ADMIN — BUPIVACAINE HYDROCHLORIDE 5 ML: 5 INJECTION, SOLUTION EPIDURAL; INTRACAUDAL at 10:18

## 2025-03-21 RX ADMIN — PROPOFOL 20 MG: 10 INJECTION, EMULSION INTRAVENOUS at 13:40

## 2025-03-21 RX ADMIN — EPHEDRINE SULFATE 5 MG: 50 INJECTION INTRAVENOUS at 13:11

## 2025-03-21 RX ADMIN — SUGAMMADEX 132 MG: 100 INJECTION, SOLUTION INTRAVENOUS at 13:37

## 2025-03-21 NOTE — ANESTHESIA POSTPROCEDURE EVALUATION
Patient: Jeannie Peacock    Procedure Summary       Date: 03/21/25 Room / Location: [facility] OR 2 / [facility] OR; [facility] ULTRASOUND    Anesthesia Start: 1148 Anesthesia Stop: 1354    Procedures:       US SONOSITE PORTABLE      SHOULDER ARTHROSCOPY DISTAL CLAVICLE RESECTION, biceps tenodesis (Left: Shoulder) Diagnosis:       Subacromial impingement of left shoulder      Biceps tendinitis of left upper extremity      AC joint arthropathy      Tendinitis of left rotator cuff      (Nerve block)      (Subacromial impingement of left shoulder [M75.42])      (Biceps tendinitis of left upper extremity [M75.22])      (AC joint arthropathy [M19.019])      (Tendinitis of left rotator cuff [M75.82])    Scheduled Providers: Ye Johnson MD Provider: Mar Whyte CRNA    Anesthesia Type: general with block ASA Status: 3            Anesthesia Type: general with block    Vitals  Vitals Value Taken Time   /83 03/21/25 14:20   Temp 98 °F (36.7 °C) 03/21/25 13:52   Pulse 84 03/21/25 14:20   Resp 24 03/21/25 14:20   SpO2 98 % 03/21/25 14:20   Vitals shown include unfiled device data.        Post Anesthesia Care and Evaluation    Patient location during evaluation: PHASE II  Patient participation: complete - patient participated  Level of consciousness: awake  Pain management: adequate    Airway patency: patent  Anesthetic complications: No anesthetic complications  PONV Status: none  Cardiovascular status: acceptable  Respiratory status: acceptable  Hydration status: acceptable

## 2025-03-21 NOTE — ANESTHESIA PROCEDURE NOTES
Peripheral Block    Pre-sedation assessment completed: 3/21/2025 10:02 AM    Patient reassessed immediately prior to procedure    Patient location during procedure: pre-op  Start time: 3/21/2025 10:13 AM  Stop time: 3/21/2025 10:18 AM  Reason for block: at surgeon's request and post-op pain management  Performed by  NEGIN/CAA: Marixa Nolan CRNA  Preanesthetic Checklist  Completed: patient identified, IV checked, site marked, risks and benefits discussed, surgical consent, monitors and equipment checked, pre-op evaluation and timeout performed  Prep:  Pt Position: supine  Sterile barriers:cap, gloves, mask and washed/disinfected hands  Prep: ChloraPrep  Patient monitoring: blood pressure monitoring, continuous pulse oximetry and EKG  Procedure    Sedation: yes  Performed under: local infiltration  Guidance:ultrasound guided    ULTRASOUND INTERPRETATION.  Using ultrasound guidance a 21 G gauge needle was placed in close proximity to the nerve, at which point, under ultrasound guidance anesthetic was injected in the area of the nerve and spread of the anesthesia was seen on ultrasound in close proximity thereto.  There were no abnormalities seen on ultrasound; a digital image was taken; and the patient tolerated the procedure with no complications. Images:still images obtained, printed/placed on chart    Laterality:left  Block Type:superficial cervical plexus  Injection Technique:single-shot  Needle Type:echogenic  Needle Gauge:21 G  Resistance on Injection: none    Medications Used: bupivacaine PF (MARCAINE) injection 0.5% - Peripheral Nerve   5 mL - 3/21/2025 10:18:00 AM      Post Assessment  Injection Assessment: negative aspiration for heme, no paresthesia on injection and incremental injection  Patient Tolerance:comfortable throughout block  Complications:no  Performed by: Marixa Nolan CRNA
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
## 2025-03-21 NOTE — OP NOTE
Date of Operation: 3/21/2025     PREOPERATIVE DIAGNOSIS:  Left shoulder biceps tendinitis  Left shoulder subacromial bursitis   left shoulder rotator cuff tendinitis  Left shoulder AC joint arthropathy    POSTOPERATIVE DIAGNOSIS:    Left shoulder biceps tendinitis  Left shoulder subacromial bursitis  Left shoulder labral fraying  Left shoulder rotator cuff tendinitis  Left shoulder glenohumeral chondromalacia  Left shoulder AC joint arthropathy    PROCEDURE PERFORMED:   Left shoulder arthroscopy with extensive debridement-humeral head, rotator cuff, labrum, subacromial bursa  Left shoulder open biceps tenodesis  Left shoulder arthroscopic distal clavicle excision     SURGEON: Ye Johnson MD     ASSISTANT:   Assistant: Vicente Bravo, SARAH was responsible for performing the following activities: Retraction, Irrigation, Closing, Placing Dressing, and Held/Positioned Camera and their skilled assistance was necessary for the success of this case.     ANESTHESIA:  general with block       ESTIMATED BLOOD LOSS:  minimal     URINE OUTPUT: Not recorded.       FLUIDS: Per anesthesia.       COMPLICATIONS: None.       SPECIMENS: None.       DRAINS: None.      IMPLANTS: [employer] 2 point millimeter Q fix anchor x 1 for biceps tenodesis     ARTHROSCOPIC FINDINGS:   1.  Glenoid-grade 1/2 chondral wear central  2.  Humeral head-small region of grade 3/4 chondral wear central humeral head with loose body  3.  Labrum-moderate degenerative fraying of anterior, posterior, superior labrum  4.  Biceps tendon-significant intra-articular biceps tenosynovitis with partial detachment of biceps anchor at superior labrum  5.  Rotator cuff-partial-thickness articular sided rotator cuff tear less than 30% of the insertional with  6.  Axillary pouch-free loose bodies  7.  Subacromial space-moderately thickened subacromial bursa, no evidence of bursal sided tear, advanced degenerative change to AC joint     INDICATIONS FOR PROCEDURE:  Patient is a pleasant 54 y.o. female who has had significant limitation and use of left shoulder as well as associated pain with failure of conservative treatments. I discussed treatment options available to the patient and patient wished to proceed with surgical treatment. I explained details of the procedure, as well as the risks, benefits, and alternatives as documented on history and physical, and the patient had all questions answered prior to signing the operative consent form. No guarantees were given in regard to results of the surgery.       DESCRIPTION OF PROCEDURE: The patient was seen, evaluated, and cleared for surgery by anesthesia. Admitted in the preoperative holding area. The operative site was marked, consent was reviewed, history and physical was updated, and preoperative labs were reviewed. A regional block was then placed per anesthesia. The patient was then taken to the operating room and placed in a supine position on a beachchair table. After successful intubation per anesthesia, facemask was placed securing head at this point time with the neck in normal anatomic position.  Patient was then elevated up into a seated position with neck maintained in normal anatomic alignment. All bony prominences were well-padded and patient was secured to the table with a waist strap. The left  upper extremity was then sterilely prepped and draped in a standard fashion.       A formal timeout was completed, including confirmation of History and Physical, operative consent, surgical site, patient identification number, and preoperative antibiotic administration.       Attention was then turned to creation of posterior portal with a 11 blade followed by insertion of blunt trocar and cannula.  Scope was inserted at this point time.  Anterior portal was then made in the rotator interval with spinal needle using outside in technique.  Cannula was then inserted over a trocar and diagnostic arthroscopic exam was  carried out at this point in time with findings as noted above.     Attention was then turned to debridement of glenohumeral joint space including debridement of the superior, anterior, posterior labral fraying with combination of hand-held shaver and ablation wand.      Attention was then turned to debridement of the humeral head chondromalacia with ablation wand as well as shaver creating a smooth stable edge to the chondral defect in the central portion of the humeral head.  Loose body was also removed at this point in time.    Attention was then turned to debridement of the articular sided partial-thickness rotator cuff tear.  Once debridement was completed the supraspinatus there is noted to be less than 30% disruption of the insertional width of the supraspinatus at this time.  This region was marked with a PDS suture for later identification from the bursal side.    Attention was then turned to release of the biceps tendon. Arthroscopic scissor was inserted through the anterior portal and used to release the biceps tendon at the superior labrum. The superior labrum was then debrided to a smooth stable edge with no residual prominence noted with use of a hand-held shaver as well as ablation wand at this time.     Attention was then turned to open biceps tenodesis.  4 cm longitudinal incision was made centered over the inferior border of the pectoralis major through skin only with 15 blade at this point time.  Once subcutaneous dissection was carried out to the inferior border the pectoralis major was bluntly elevated proximal lateral and retracted at this time.  Biceps tendon was identified at this point time and retrieved with the right angle.  Q fix guide was placed in the bicipital groove of the proximalmost portion identified under direct visualization.  Drill was passed in unicortical fashion followed by insertion of Q fix anchor which was secured into position with good stability on tension across the  suture strands.  Biceps tendon was then whipstitched with a running locking stitch, using one suture strand from each pair.  The second strand was passed in simple fashion to the biceps tendon.  The musculotendinous junction was reapproximated to the inferior border of the pectoralis major.  Excess biceps tendon was resected and tenodesis was then completed with the tendon being tied down with 6 alternating half hitches ensuring good loop and knot security of the repair site.  Sutures were cut short at this point time.  Wound was then thoroughly irrigated with normal saline.      Attention was then returned to the subacromial space where the scope was inserted through the posterior portal, cannula inserted through the anterior portal and subacromial space was evaluated at this point in time.  Debridement of subacromial bursitis was completed with shaver as well as ablation wand at this point time with no evidence of bursal sided rotator cuff tear or laxity in the rotator cuff tendons to suggest intrasubstance tear.      Attention was then turned to arthroscopic distal clavicle excision.  Ablation wand was used to debride inferior and anterior soft tissues to allow for complete visualization of the distal clavicle where advanced degenerative changes were noted including loss of articular cartilage as well as osteophytes.  Bur was then inserted through an anterior approach with visualization from a posterior approach using both a 30° and 70° scope.  Resection of approximately 5 mm of the distal clavicle was then carried out this point time in line with the AC joint.  Minimal resection of the distal acromion and inferior acromion was completed as well.  Once resection was completed inferior directed pressure was placed in the distal clavicle and there is no evidence of residual impingement at the acromioclavicular joint.  Posterior and superior acromioclavicular  ligaments and capsule were noted to be preserved  following resection    Fluid was evacuated with suction and arthroscopic instruments were removed at this point time.     Attention was then turned to closure the wounds with biceps tenodesis site being closed with 3-0 Vicryl for subcutaneous closure in inverted fashion followed by skin closure with 3-0 Monocryl and Steri-Strips.  Portal sites were closed with 3-0 nylon in interrupted fashion.  Wounds were dressed with Xeroform gauze, 4 x 4, Tegaderm, ABD pad, Medipore tape and patient was placed in a sling with abduction pillow to the left side.     At the end of the procedure, all lap, needle, and sponge counts were correct x2. The patient had brisk capillary refill to all digits of the left upper extremity. Compartments were soft and easily compressible at the end of the procedure.       DISPOSITION: The patient was extubated per anesthesia and taken to the recovery room in stable condition. Will follow up in office in 1 week for wound check. Results discussed immediately after procedure with family and all questions were answered at that time.       REHAB: We will place patient on biceps tenodesis protocol, sling x 4 weeks, begin physical therapy week 2 for shoulder range of motion.

## 2025-03-21 NOTE — ANESTHESIA PREPROCEDURE EVALUATION
Anesthesia Evaluation     Patient summary reviewed and Nursing notes reviewed   no history of anesthetic complications:   NPO Solid Status: > 8 hours  NPO Liquid Status: > 8 hours           Airway   Mallampati: II  TM distance: >3 FB  Neck ROM: full  No difficulty expected  Dental    (+) upper dentures    Pulmonary     breath sounds clear to auscultation  (+) a smoker Current, Smoked day of surgery, cigarettes,  Cardiovascular   Exercise tolerance: good (4-7 METS)    ECG reviewed  PT is on anticoagulation therapy  Rhythm: regular  Rate: normal    (+) hypertension (pt was take off of medication by MD), hyperlipidemia    ROS comment: Pt had heart cath 1yr ago results are not in care everywhere, pt states she was medically managed after and no stents.  Cardiac clearance in media    RR Interval= 857 ms  NH Interval= 156 ms  QRSD Interval= 90 ms  QT Interval= 400 ms  QTc Interval= 432 ms  Heart Rate= 70 ms  P Axis= 23 deg  QRS Axis= 10 deg  T Wave Axis= 26 deg  I: 40 Axis= 24 deg  T: 40 Axis= 6 deg  ST Axis= 33 deg  SINUS RHYTHM  BORDERLINE R WAVE PROGRESSION, ANTERIOR LEADS  Electronically Signed by:  [facility]namdi [facility] ([facility] 13-Feb-2017 22:27:17  Date and Time of Study: 2017-02-13 10:25:38    IMPRESSION: 9-2023  1. Total coronary calcium score is 235 which is in the 99th percentile for age/gender matched controls.  2. Non diagnostic coronary CTA given significant motion in the left coronary system due to breathing motion.   RCA is dominant and has a 30-40% proximal calcific stenosis.  Alternative ischemic testing is recommended.    10-19-23  Normal exercise cardiolite stress test       Neuro/Psych  (+) headaches, psychiatric history Anxiety and Depression  GI/Hepatic/Renal/Endo    (+) GERD well controlled    Musculoskeletal (-) negative ROS    Abdominal    Substance History - negative use     OB/GYN          Other - negative ROS                     Anesthesia Plan    ASA 3     general with block     intravenous  induction     Anesthetic plan, risks, benefits, and alternatives have been provided, discussed and informed consent has been obtained with: patient.    Use of blood products discussed with patient  Consented to blood products.      CODE STATUS:

## 2025-03-21 NOTE — ANESTHESIA PROCEDURE NOTES
Peripheral Block    Pre-sedation assessment completed: 3/21/2025 10:02 AM    Patient reassessed immediately prior to procedure    Patient location during procedure: pre-op  Start time: 3/21/2025 10:08 AM  Stop time: 3/21/2025 10:12 AM  Reason for block: at surgeon's request and post-op pain management  Performed by  NEGIN/CAA: Marixa Nolan CRNA  Preanesthetic Checklist  Completed: patient identified, IV checked, site marked, risks and benefits discussed, surgical consent, monitors and equipment checked, pre-op evaluation and timeout performed  Prep:  Pt Position: supine  Sterile barriers:cap, gloves, mask and washed/disinfected hands  Prep: ChloraPrep  Patient monitoring: blood pressure monitoring, continuous pulse oximetry and EKG  Procedure    Sedation: yes  Performed under: local infiltration  Guidance:ultrasound guided    ULTRASOUND INTERPRETATION.  Using ultrasound guidance a 21 G gauge needle was placed in close proximity to the nerve, at which point, under ultrasound guidance anesthetic was injected in the area of the nerve and spread of the anesthesia was seen on ultrasound in close proximity thereto.  There were no abnormalities seen on ultrasound; a digital image was taken; and the patient tolerated the procedure with no complications. Images:still images obtained, printed/placed on chart    Laterality:left  Block Type:interscalene  Injection Technique:single-shot  Needle Type:echogenic  Needle Gauge:21 G  Resistance on Injection: none    Medications Used: bupivacaine PF (MARCAINE) injection 0.5% - Peripheral Nerve   15 mL - 3/21/2025 10:12:00 AM  dexmedetomidine HCl (PRECEDEX) injection - Perineural   30 mcg - 3/21/2025 10:12:00 AM  lidocaine PF 2% (XYLOCAINE) injection - Infiltration   3 mL - 3/21/2025 10:12:00 AM      Post Assessment  Injection Assessment: negative aspiration for heme, no paresthesia on injection and incremental injection  Patient Tolerance:comfortable throughout  block  Complications:no  Performed by: Marixa Nolan, CRNA

## 2025-03-21 NOTE — ANESTHESIA PROCEDURE NOTES
Airway  Reason: elective    Date/Time: 3/21/2025 12:03 PM  Airway not difficult    General Information and Staff    Patient location during procedure: OR  CRNA/CAA: Marixa Nolan CRNA    Indications and Patient Condition  Indications for airway management: airway protection    Preoxygenated: yes  MILS maintained throughout    Mask difficulty assessment: 1 - vent by mask    Final Airway Details    Final airway type: endotracheal airway      Successful airway: ETT  Cuffed: yes   Successful intubation technique: direct laryngoscopy  Adjuncts used in placement: intubating stylet  Endotracheal tube insertion site: oral  Blade: Stacia  Blade size: 3  ETT size (mm): 7.0  Cormack-Lehane Classification: grade I - full view of glottis  Placement verified by: chest auscultation and capnometry   Measured from: lips  ETT/EBT  to lips (cm): 21  Number of attempts at approach: 1  Assessment: lips, teeth, and gum same as pre-op and atraumatic intubation

## 2025-03-28 ENCOUNTER — OFFICE VISIT (OUTPATIENT)
Dept: ORTHOPEDIC SURGERY | Facility: CLINIC | Age: 55
End: 2025-03-28
Payer: COMMERCIAL

## 2025-03-28 VITALS — HEIGHT: 62 IN | BODY MASS INDEX: 26.87 KG/M2 | WEIGHT: 146 LBS

## 2025-03-28 DIAGNOSIS — Z98.890 STATUS POST ARTHROSCOPY OF SHOULDER: Primary | ICD-10-CM

## 2025-03-28 PROCEDURE — 99024 POSTOP FOLLOW-UP VISIT: CPT | Performed by: NURSE PRACTITIONER

## 2025-03-28 RX ORDER — IBUPROFEN 800 MG/1
800 TABLET, FILM COATED ORAL 3 TIMES DAILY
Qty: 90 TABLET | Refills: 0 | Status: SHIPPED | OUTPATIENT
Start: 2025-03-28

## 2025-03-28 NOTE — PROGRESS NOTES
CC: F/u s/p left shoulder arthroscopy with extensive debridement-humeral head, rotator cuff, labrum, subacromial bursa, open biceps tenodesis, distal clavicle excision, DOS 3/21/2025     Interval History: Patient returns to clinic stating pain is doing fairly well, has been using sling as instructed, denies any numbness or tingling over left arm. No fevers, chills, or sweats, and no drainage from incisions noted.    Exam:   Left shoulder- incisions clean, dry, sutures in place   Positive sensation all distributions left hand and proximal lateral aspect arm   Cap refill < 3 seconds, radial pulse 2+   Positive deltoid firing   Flex/extend fingers/thumb/wrist with 4+/5 strength, positive thumbs up, okay sign, cross finger adduction and abduction against resistance     Impression: s/p left shoulder arthroscopy with extensive debridement-humeral head, rotator cuff, labrum, subacromial bursa, open biceps tenodesis, distal clavicle excision     Plan:  1. D/c sutures today and replace with steri-strips- may shower, no submerging wounds x 4 weeks  2. F/u in 3 wks  3. Will start PT at 2 wk rafal. Continue use of sling x 4 weeks. Work on finger and wrist ROM. May do gentle elbow ROM 2x/day while out of sling for showering or changing clothes.   4. All questions answered      New Medications Ordered This Visit   Medications    ibuprofen (ADVIL,MOTRIN) 800 MG tablet     Sig: Take 1 tablet by mouth 3 (Three) Times a Day.     Dispense:  90 tablet     Refill:  0       Orders Placed This Encounter   Procedures    Ambulatory Referral to Physical Therapy for Evaluation & Treatment     Referral Priority:   Routine     Referral Type:   Physical Therapy     Referral Reason:   Specialty Services Required     Requested Specialty:   Physical Therapy     Number of Visits Requested:   1

## 2025-04-30 ENCOUNTER — OFFICE VISIT (OUTPATIENT)
Dept: ORTHOPEDIC SURGERY | Facility: CLINIC | Age: 55
End: 2025-04-30
Payer: COMMERCIAL

## 2025-04-30 VITALS — HEIGHT: 62 IN | BODY MASS INDEX: 26.87 KG/M2 | WEIGHT: 146 LBS

## 2025-04-30 DIAGNOSIS — Z98.890 STATUS POST ARTHROSCOPY OF SHOULDER: Primary | ICD-10-CM

## 2025-04-30 PROCEDURE — 99024 POSTOP FOLLOW-UP VISIT: CPT | Performed by: ORTHOPAEDIC SURGERY

## 2025-04-30 RX ORDER — PREDNISONE 10 MG/1
TABLET ORAL
Qty: 39 TABLET | Refills: 0 | Status: SHIPPED | OUTPATIENT
Start: 2025-04-30

## 2025-04-30 RX ORDER — PREGABALIN 75 MG/1
75 CAPSULE ORAL 2 TIMES DAILY
Qty: 60 CAPSULE | Refills: 0 | Status: SHIPPED | OUTPATIENT
Start: 2025-04-30
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
## 2025-06-18 ENCOUNTER — OFFICE VISIT (OUTPATIENT)
Dept: ORTHOPEDIC SURGERY | Facility: CLINIC | Age: 55
End: 2025-06-18
Payer: COMMERCIAL

## 2025-06-18 VITALS — HEIGHT: 62 IN | WEIGHT: 146 LBS | BODY MASS INDEX: 26.87 KG/M2

## 2025-06-18 DIAGNOSIS — Z98.890 STATUS POST ARTHROSCOPY OF SHOULDER: Primary | ICD-10-CM

## 2025-06-18 DIAGNOSIS — M75.52 SUBACROMIAL BURSITIS OF LEFT SHOULDER JOINT: ICD-10-CM

## 2025-06-18 RX ORDER — VALACYCLOVIR HYDROCHLORIDE 1 G/1
TABLET, FILM COATED ORAL
COMMUNITY
Start: 2025-04-17

## 2025-06-18 RX ADMIN — LIDOCAINE HYDROCHLORIDE 4 ML: 10 INJECTION, SOLUTION EPIDURAL; INFILTRATION; INTRACAUDAL; PERINEURAL at 11:16

## 2025-06-18 RX ADMIN — TRIAMCINOLONE ACETONIDE 80 MG: 40 INJECTION, SUSPENSION INTRA-ARTICULAR; INTRAMUSCULAR at 11:16
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
## 2025-06-20 RX ORDER — LIDOCAINE HYDROCHLORIDE 10 MG/ML
4 INJECTION, SOLUTION EPIDURAL; INFILTRATION; INTRACAUDAL; PERINEURAL
Status: COMPLETED | OUTPATIENT
Start: 2025-06-18 | End: 2025-06-18

## 2025-06-20 RX ORDER — TRIAMCINOLONE ACETONIDE 40 MG/ML
80 INJECTION, SUSPENSION INTRA-ARTICULAR; INTRAMUSCULAR
Status: COMPLETED | OUTPATIENT
Start: 2025-06-18 | End: 2025-06-18

## (undated) DEVICE — GLV SURG SENSICARE PI LF PF 7.0

## (undated) DEVICE — SOL IRR H2O BO 1000ML STRL

## (undated) DEVICE — INTENDED TO SUPPORT AND MAINTAIN THE POSITION OF AN ANESTHETIZED PATIENT DURING SURGERY: Brand: MARCO SHOULDER STABILIZATION KIT

## (undated) DEVICE — SPNG GZ WOVN 4X4IN 12PLY 10/BX STRL

## (undated) DEVICE — CANNULA THREADED FLEX 5.5 X 72MM: Brand: CLEAR-TRAC

## (undated) DEVICE — DECANTER BAG 9": Brand: MEDLINE INDUSTRIES, INC.

## (undated) DEVICE — DRAPE,TOWEL,LARGE,INVISISHIELD: Brand: MEDLINE

## (undated) DEVICE — PENCL SMOKE/EVAC MEGADYNE TELESCP 10FT

## (undated) DEVICE — TRANSFER SET 3": Brand: MEDLINE INDUSTRIES, INC.

## (undated) DEVICE — TRAP FLD MINIVAC MEGADYNE 100ML

## (undated) DEVICE — ST TB GOFLO STRL

## (undated) DEVICE — STRAP,POSITIONING,KNEE/BODY,FOAM,4X60": Brand: MEDLINE

## (undated) DEVICE — WEREWOLF FLOW 90 COBLATION WAND: Brand: COBLATION

## (undated) DEVICE — PAD,NON-ADHERENT,3X8,STERILE,LF,1/PK: Brand: MEDLINE

## (undated) DEVICE — T-MAX DISPOSABLE FACE MASK 8 PER BOX

## (undated) DEVICE — SUT ETHLN 3/0 PS2 18 IN 1669H

## (undated) DEVICE — GLV SURG SENSICARE PF POLYISPRN SZ8 LF

## (undated) DEVICE — GLV SURG SENSICARE PI PF LF 7 GRN STRL

## (undated) DEVICE — WRAP SHLDR COMPR COLD/THERP

## (undated) DEVICE — STRIP,CLOSURE,WOUND,MEDI-STRIP,1/2X4: Brand: MEDLINE

## (undated) DEVICE — DRP SURG U/DRP INVISISHIELD PA 48X52IN

## (undated) DEVICE — 3M™ MEDIPORE™ H SOFT CLOTH SURGICAL TAPE 2864, 4 INCH X 10 YARD (10CM X 9,14M), 12 ROLLS/CASE: Brand: 3M™ MEDIPORE™

## (undated) DEVICE — ADAPT DB SPIKE 2PCT FOR AR6400 TBG

## (undated) DEVICE — TOWEL,OR,DSP,ST,BLUE,STD,4/PK,20PK/CS: Brand: MEDLINE

## (undated) DEVICE — SYR LUERLOK 20CC BX/50

## (undated) DEVICE — SYR LUERLOK 50ML

## (undated) DEVICE — APPL CHLORAPREP HI/LITE 26ML ORNG

## (undated) DEVICE — PK SHLDR ARTHSCP 90

## (undated) DEVICE — SOL ISO/ALC RUB 70PCT 4OZ

## (undated) DEVICE — 4.5 FULL RADIUS BONECUTTER BLADES,                                    YELLOW, 8000 MAXIMUM RPM, PACKAGED 6                                    PER BOX, STERILE

## (undated) DEVICE — HYPODERMIC SAFETY NEEDLE: Brand: MAGELLAN

## (undated) DEVICE — Q-FIX REUSABLE 2.8MM PATHFINDER OBTURATOR: Brand: Q-FIX

## (undated) DEVICE — DRSNG SURESITE WNDW 4X4.5

## (undated) DEVICE — SUT PDS 0 CT1 36IN Z346H